# Patient Record
Sex: FEMALE | Race: WHITE | NOT HISPANIC OR LATINO | Employment: OTHER | URBAN - METROPOLITAN AREA
[De-identification: names, ages, dates, MRNs, and addresses within clinical notes are randomized per-mention and may not be internally consistent; named-entity substitution may affect disease eponyms.]

---

## 2014-07-16 LAB — CRC RECOMMENDATION EXT: NORMAL

## 2017-12-14 ENCOUNTER — HISTORICAL (OUTPATIENT)
Dept: ADMINISTRATIVE | Facility: HOSPITAL | Age: 57
End: 2017-12-14

## 2022-01-06 ENCOUNTER — HISTORICAL (OUTPATIENT)
Dept: ADMINISTRATIVE | Facility: HOSPITAL | Age: 62
End: 2022-01-06

## 2022-01-06 LAB — SARS-COV-2 RNA RESP QL NAA+PROBE: POSITIVE

## 2022-04-07 ENCOUNTER — HISTORICAL (OUTPATIENT)
Dept: ADMINISTRATIVE | Facility: HOSPITAL | Age: 62
End: 2022-04-07

## 2022-04-23 VITALS
SYSTOLIC BLOOD PRESSURE: 126 MMHG | BODY MASS INDEX: 24.98 KG/M2 | OXYGEN SATURATION: 100 % | WEIGHT: 149.94 LBS | HEIGHT: 65 IN | DIASTOLIC BLOOD PRESSURE: 75 MMHG

## 2022-05-01 NOTE — HISTORICAL [facility] CERNER
This is a historical note converted from Hermila. Formatting and pictures may have been removed.  Please reference Hermila for original formatting and attached multimedia. Chief Complaint  fell off latter around 8 am - rolled Lt ankle, bruised tailbone and hit head on wall  History of Present Illness  56 yo F with c/o fall down one step of a step ladder just PTA, rolled L ankle, fell onto buttock, light hit onto R side of head, only pain currently is L lateral ankle, pain with ambulation, tried ice.  Review of Systems  Constitutional_negative for fever  ENMT_negative for rhinorrhea, sinus pressure, sore throat,?blurry vision or change in vision  Respiratory_negative for?cough, SOB  Gastrointestinal_negative for nausea or vomiting  Integumentary_+ecchymosis and bruising to L lateral ankle  Physical Exam  Vitals & Measurements  T:?36.5? ?C ?(Oral)? HR:?83?(Peripheral)? RR:?18? BP:?104/62? SpO2:?99%?  HT:?163?cm? HT:?163?cm? WT:?66.8?kg? WT:?66.8?kg? BMI:?25.14?  Assessment/Plan  1.?Left ankle pain  ? X-ray of the left ankle done today, per my review negative for fracture.? Will call with final report.  Ordered:  Office/Outpatient Visit Level 3 Established [id number] PC, Left ankle pain  Fall, [facility], 17 10:35:00 CST  XR Ankle Left Minimum 3 Views, Routine, 17 10:35:00 CST, Pain, lateral ankle pain, None, Ambulatory, Rad Type, Left ankle pain  Fall, Not Scheduled, 17 10:35:00 CST  ?  3.?Left ankle sprain  ? Recommend ice, elevate, and anti inflammatory?like Toradol or Ibuprofen with food.  ?  Orders:  ketorolac, 10 mg = 1 tab(s), Oral, QID, PRN PRN for pain, X 5 day(s), # 30 tab(s), 0 Refill(s), Pharmacy: [facility]   Problem List/Past Medical History  Ongoing  No chronic problems  Historical  Procedure/Surgical History   ()  bilateral knee surgery ()   ()  Medications  Toradol 10 mg oral tablet, 10 mg= 1 tab(s), Oral, QID, PRN  Allergies  No Known Medication  Allergies  Social History  Alcohol - 12/14/2017  Never  Substance Abuse - 12/14/2017  Never  Tobacco - 12/14/2017  Never smoker  Family History  Family history is negative

## 2023-04-06 ENCOUNTER — OFFICE VISIT (OUTPATIENT)
Dept: URGENT CARE | Facility: CLINIC | Age: 63
End: 2023-04-06
Payer: COMMERCIAL

## 2023-04-06 VITALS
TEMPERATURE: 99 F | DIASTOLIC BLOOD PRESSURE: 69 MMHG | HEART RATE: 76 BPM | OXYGEN SATURATION: 100 % | SYSTOLIC BLOOD PRESSURE: 110 MMHG | WEIGHT: 135 LBS | RESPIRATION RATE: 17 BRPM | HEIGHT: 64 IN | BODY MASS INDEX: 23.05 KG/M2

## 2023-04-06 DIAGNOSIS — J02.8 ACUTE PHARYNGITIS DUE TO OTHER SPECIFIED ORGANISMS: Primary | ICD-10-CM

## 2023-04-06 DIAGNOSIS — J02.9 SORE THROAT: ICD-10-CM

## 2023-04-06 LAB
CTP QC/QA: YES
MOLECULAR STREP A: NEGATIVE

## 2023-04-06 PROCEDURE — 99212 PR OFFICE/OUTPT VISIT, EST, LEVL II, 10-19 MIN: ICD-10-PCS | Mod: ,,, | Performed by: FAMILY MEDICINE

## 2023-04-06 PROCEDURE — 87651 STREP A DNA AMP PROBE: CPT | Mod: QW,,, | Performed by: FAMILY MEDICINE

## 2023-04-06 PROCEDURE — 99212 OFFICE O/P EST SF 10 MIN: CPT | Mod: ,,, | Performed by: FAMILY MEDICINE

## 2023-04-06 PROCEDURE — 87651 POCT STREP A MOLECULAR: ICD-10-PCS | Mod: QW,,, | Performed by: FAMILY MEDICINE

## 2023-04-06 NOTE — PATIENT INSTRUCTIONS
Discussed the physical finding, concerns of possible early testing examination.  Monitor the symptoms closely.  Adequate hydration.  Strep test negative today  With persistent symptoms and fever return to clinic after 24 hours as nurse's visit for repeat strep test  Warm saltwater gargles for sore throat.  Tylenol ibuprofen for pain and fever.  Monitor the symptoms.  Call or return to clinic for any questions

## 2023-04-06 NOTE — PROGRESS NOTES
"Subjective:      Patient ID: Swapna Hayes is a 63 y.o. female.    Vitals:  height is 5' 4" (1.626 m) and weight is 61.2 kg (135 lb). Her temperature is 99.1 °F (37.3 °C). Her blood pressure is 110/69 and her pulse is 76. Her respiration is 17 and oxygen saturation is 100%.     Chief Complaint: Sore Throat (Sore throat since Monday with slight fatigue. 99 temp Tuesday. [place] better Wednesday but then symptoms returned again. Tried otc med cold/flu med.)    HPI:  63-year-old female otherwise healthy present to clinic with concerns of sore throat and fatigue since 4 days.  T-max at home 100.5.  Currently on Tylenol ibuprofen some help.  No concerns of exposure to infections.  Teaches at school.  Vaccinated for COVID-19.  Defers testing for COVID today    ROS :  Constitutional : _ fever , Body aches, Chills  HENT_sore throat, postnasal drainage  Respiratory_no wheezing, no shortness of breath  Cardiovascular_no chest pain  Gastrointestinal_ No vomiting, No diarrhea, No abdominal pain  Musculoskeletal_no joint pain, no joint swelling  Integumentary_no skin rash or abnormal lesion    Objective:     Physical Exam  General : Alert and Oriented, No apparent distress, afebrile  Neck - supple, anterior cervical lymph nodes enlarged tender to palpate  HENT : Oropharynx and tonsils appears erythematous and swollen, tonsils 2+ bilateral, no exudate..  Left ear canal excessive cerumen, right TM intact no redness    Respiratory : Bilateral equal breath sounds, nonlabored respirations  Cardiovascular : Rate, rhythm regular, normal volume pulse, no murmur  Gastrointestinal: Full abdomen, soft, nontender to palpate  Integumentary : Warm, Dry     Assessment:     1. Acute pharyngitis due to other specified organisms    2. Sore throat        Plan:   Discussed the physical finding, concerns of possible early testing examination.  Monitor the symptoms closely.  Adequate hydration.  Strep test negative today  With persistent symptoms and " fever return to clinic after 24 hours as nurse's visit for repeat strep test  Warm saltwater gargles for sore throat.  Tylenol ibuprofen for pain and fever.  Monitor the symptoms.  Call or return to clinic for any questions    Acute pharyngitis due to other specified organisms    Sore throat  -     POCT Strep A, Molecular

## 2023-04-10 ENCOUNTER — OFFICE VISIT (OUTPATIENT)
Dept: URGENT CARE | Facility: CLINIC | Age: 63
End: 2023-04-10
Payer: COMMERCIAL

## 2023-04-10 VITALS
HEART RATE: 87 BPM | HEIGHT: 64 IN | SYSTOLIC BLOOD PRESSURE: 110 MMHG | RESPIRATION RATE: 18 BRPM | TEMPERATURE: 99 F | DIASTOLIC BLOOD PRESSURE: 66 MMHG | OXYGEN SATURATION: 98 % | BODY MASS INDEX: 22.88 KG/M2 | WEIGHT: 134 LBS

## 2023-04-10 DIAGNOSIS — J02.9 SORE THROAT: Primary | ICD-10-CM

## 2023-04-10 LAB
CTP QC/QA: YES
MOLECULAR STREP A: NEGATIVE
POC MOLECULAR INFLUENZA A AGN: NEGATIVE
POC MOLECULAR INFLUENZA B AGN: NEGATIVE
SARS-COV-2 RDRP RESP QL NAA+PROBE: NEGATIVE

## 2023-04-10 PROCEDURE — 99213 OFFICE O/P EST LOW 20 MIN: CPT | Mod: ,,, | Performed by: FAMILY MEDICINE

## 2023-04-10 PROCEDURE — 87502 INFLUENZA DNA AMP PROBE: CPT | Mod: QW,,, | Performed by: FAMILY MEDICINE

## 2023-04-10 PROCEDURE — 87651 POCT STREP A MOLECULAR: ICD-10-PCS | Mod: QW,,, | Performed by: FAMILY MEDICINE

## 2023-04-10 PROCEDURE — 87651 STREP A DNA AMP PROBE: CPT | Mod: QW,,, | Performed by: FAMILY MEDICINE

## 2023-04-10 PROCEDURE — 87502 POCT INFLUENZA A/B MOLECULAR: ICD-10-PCS | Mod: QW,,, | Performed by: FAMILY MEDICINE

## 2023-04-10 PROCEDURE — 87635: ICD-10-PCS | Mod: QW,,, | Performed by: FAMILY MEDICINE

## 2023-04-10 PROCEDURE — 99213 PR OFFICE/OUTPT VISIT, EST, LEVL III, 20-29 MIN: ICD-10-PCS | Mod: ,,, | Performed by: FAMILY MEDICINE

## 2023-04-10 PROCEDURE — 87635 SARS-COV-2 COVID-19 AMP PRB: CPT | Mod: QW,,, | Performed by: FAMILY MEDICINE

## 2023-04-10 RX ORDER — PREDNISONE 20 MG/1
20 TABLET ORAL 2 TIMES DAILY
Qty: 6 TABLET | Refills: 0 | Status: SHIPPED | OUTPATIENT
Start: 2023-04-10 | End: 2023-04-13

## 2023-04-10 RX ORDER — CEPHALEXIN 500 MG/1
500 CAPSULE ORAL EVERY 8 HOURS
Qty: 15 CAPSULE | Refills: 0 | Status: SHIPPED | OUTPATIENT
Start: 2023-04-10 | End: 2023-04-15

## 2023-04-10 NOTE — PROGRESS NOTES
"Subjective:      Patient ID: Swapna Hayes is a 63 y.o. female.    Vitals:  height is 5' 4" (1.626 m) and weight is 60.8 kg (134 lb). Her temperature is 99.2 °F (37.3 °C). Her blood pressure is 110/66 and her pulse is 87. Her respiration is 18 and oxygen saturation is 98%.     Chief Complaint: Sore Throat (Sore throat, cough, fatigue - seen 4/6 and not better but symptoms started about a week ago )    HPI:  63-year-old returns to clinic with concerns of continuing swollen neck glands, feeling feverish, coughing and sore throat.  Symptoms close to a week now.  Seen in the clinic initially tested negative for strep.  Temp in the clinic 99.2.  No concerns of exposure to infections.  Teacher at school    ROS :  Constitutional : _ feverish , Body aches, Chills  Eyes : _No redness, drainage or pain  HENT_sore throat, postnasal drainage  Respiratory_no wheezing, no shortness of breath  Cardiovascular_no chest pain  Gastrointestinal_ No vomiting, No diarrhea, No abdominal pain  Musculoskeletal_no joint pain, no joint swelling  Integumentary_no skin rash     Objective:     Physical Exam  General : Alert and Oriented, No apparent distress, afebrile  Neck - supple, anterior cervical lymph nodes enlarged tender to palpate mainly on left side  HENT : Oropharynx mild redness and swelling, tonsils not enlarged, TMs intact mild fluid no redness.   Respiratory : Bilateral equal breath sounds, nonlabored respirations  Cardiovascular : Rate, rhythm regular, normal volume pulse, no murmur  Integumentary : Warm, Dry and no rash    Assessment:     1. Sore throat        Plan:   Discussed the physical findings, condition and course.  Considering the duration of symptoms medications as directed.  Warm saltwater gargles for sore throat.  Tylenol ibuprofen for pain and discomfort.  Call or return to clinic for any questions.  Follow-up with primary MD  Strep test negative, flu test negative, COVID-19 test negative    Sore throat  -     " POCT COVID-19 Rapid Screening  -     POCT Influenza A/B MOLECULAR  -     POCT Strep A, Molecular  -     cephALEXin (KEFLEX) 500 MG capsule; Take 1 capsule (500 mg total) by mouth every 8 (eight) hours. for 5 days  Dispense: 15 capsule; Refill: 0  -     predniSONE (DELTASONE) 20 MG tablet; Take 1 tablet (20 mg total) by mouth 2 (two) times daily. for 3 days  Dispense: 6 tablet; Refill: 0

## 2023-04-10 NOTE — PATIENT INSTRUCTIONS
Discussed the physical findings, condition and course.  Considering the duration of symptoms medications as directed.  Warm saltwater gargles for sore throat.  Tylenol ibuprofen for pain and discomfort.  Call or return to clinic for any questions.  Follow-up with primary MD  Strep test negative, flu test negative, COVID-19 test negative

## 2023-05-18 ENCOUNTER — OFFICE VISIT (OUTPATIENT)
Dept: URGENT CARE | Facility: CLINIC | Age: 63
End: 2023-05-18
Payer: COMMERCIAL

## 2023-05-18 VITALS
SYSTOLIC BLOOD PRESSURE: 134 MMHG | BODY MASS INDEX: 22.88 KG/M2 | HEART RATE: 96 BPM | HEIGHT: 64 IN | TEMPERATURE: 99 F | DIASTOLIC BLOOD PRESSURE: 96 MMHG | WEIGHT: 134 LBS | OXYGEN SATURATION: 98 % | RESPIRATION RATE: 16 BRPM

## 2023-05-18 DIAGNOSIS — H10.9 CONJUNCTIVITIS OF BOTH EYES, UNSPECIFIED CONJUNCTIVITIS TYPE: Primary | ICD-10-CM

## 2023-05-18 PROCEDURE — 99213 PR OFFICE/OUTPT VISIT, EST, LEVL III, 20-29 MIN: ICD-10-PCS | Mod: ,,, | Performed by: FAMILY MEDICINE

## 2023-05-18 PROCEDURE — 99213 OFFICE O/P EST LOW 20 MIN: CPT | Mod: ,,, | Performed by: FAMILY MEDICINE

## 2023-05-18 RX ORDER — OFLOXACIN 3 MG/ML
1 SOLUTION/ DROPS OPHTHALMIC 4 TIMES DAILY
Qty: 5 ML | Refills: 0 | Status: SHIPPED | OUTPATIENT
Start: 2023-05-18 | End: 2023-05-25

## 2023-05-18 NOTE — PATIENT INSTRUCTIONS
Plan:   Medication sent to pharmacy  Wash hands before and after using the eyedrops  As discussed, you need to take your contact lenses out as soon as possible.  Do not put your contact lenses back in into the eyes 100% better.  Use new contact lenses at that time.  As discussed contact lens use hers can also develop inflammatory processes of the eye.  Therefore if your symptoms are not improving or they are was worsening such as changes in vision, sensitivity to light, or sharp or severe eye ball pain, seek medical attention immediately

## 2023-05-18 NOTE — PROGRESS NOTES
"Subjective:      Patient ID: Swapna Hayes is a 63 y.o. female.    Vitals:  height is 5' 4" (1.626 m) and weight is 60.8 kg (134 lb). Her temperature is 99.1 °F (37.3 °C). Her blood pressure is 134/96 (abnormal) and her pulse is 96. Her respiration is 16 and oxygen saturation is 98%.     Chief Complaint: Eye Problem (Bilateral eye redness noticed today. Some eye drainage and irritation. Concern for pink eye.)    63-year-old female presents to clinic complaining of bilateral eye redness and goopy discharge from the eyes.  Denies any sharp or severe eye pain.  Denies any changes in vision or sensitivity to light.  Patient is a contact lens user.  She has not removed her contact lenses yet.      Constitution: Negative.   HENT: Negative.     Cardiovascular: Negative.    Eyes:  Positive for eye discharge and eye pain.   Respiratory: Negative.     Gastrointestinal: Negative.    Genitourinary: Negative.    Musculoskeletal: Negative.    Skin: Negative.    Allergic/Immunologic: Negative.    Neurological: Negative.    Hematologic/Lymphatic: Negative.     Objective:     Physical Exam   Constitutional: She is oriented to person, place, and time.  Non-toxic appearance. She does not appear ill. No distress.   HENT:   Head: Normocephalic and atraumatic.   Eyes: Conjunctivae are normal. Pupils are equal, round, and reactive to light. Right eye exhibits discharge (erythema of the bilateral conjunctiva). Left eye exhibits discharge. Extraocular movement intact   Abdominal: Normal appearance.   Neurological: She is alert and oriented to person, place, and time.   Skin: Skin is not diaphoretic.   Psychiatric: Her behavior is normal. Mood, judgment and thought content normal.   Vitals reviewed.       Previous History      Review of patient's allergies indicates:  No Known Allergies    Past Medical History:   Diagnosis Date    Known health problems: none      Current Outpatient Medications   Medication Instructions    ofloxacin " "(OCUFLOX) 0.3 % ophthalmic solution 1 drop, Both Eyes, 4 times daily     Past Surgical History:   Procedure Laterality Date     SECTION      Closed treatment of trimalleolar ankle fracture; with manipulation (2018)      KNEE SURGERY Bilateral 1993    Repair Right Hand Subcutaneous Tissue and Fascia, Open Approach (2018)      Reposition Right Fibula, External Approach (2018)      Reposition Right Tibia, External Approach (2018)       Family History   Problem Relation Age of Onset    No Known Problems Mother     No Known Problems Father     No Known Problems Sister     No Known Problems Brother        Social History     Tobacco Use    Smoking status: Never    Smokeless tobacco: Never   Substance Use Topics    Alcohol use: Yes    Drug use: Never        Physical Exam      Vital Signs Reviewed   BP (!) 134/96   Pulse 96   Temp 99.1 °F (37.3 °C)   Resp 16   Ht 5' 4" (1.626 m)   Wt 60.8 kg (134 lb)   SpO2 98%   BMI 23.00 kg/m²        Procedures    Procedures     Labs     Results for orders placed or performed in visit on 04/10/23   POCT COVID-19 Rapid Screening   Result Value Ref Range    POC Rapid COVID Negative Negative     Acceptable Yes    POCT Influenza A/B MOLECULAR   Result Value Ref Range    POC Molecular Influenza A Ag Negative Negative, Not Reported    POC Molecular Influenza B Ag Negative Negative, Not Reported     Acceptable Yes    POCT Strep A, Molecular   Result Value Ref Range    Molecular Strep A, POC Negative Negative     Acceptable Yes        Assessment:     1. Conjunctivitis of both eyes, unspecified conjunctivitis type        Plan:   Medication sent to pharmacy  Wash hands before and after using the eyedrops  As discussed, you need to take your contact lenses out as soon as possible.  Do not put your contact lenses back in into the eyes 100% better.  Use new contact lenses at that time.  As discussed contact lens use " hers can also develop inflammatory processes of the eye.  Therefore if your symptoms are not improving or they are was worsening such as changes in vision, sensitivity to light, or sharp or severe eye ball pain, seek medical attention immediately    Conjunctivitis of both eyes, unspecified conjunctivitis type    Other orders  -     ofloxacin (OCUFLOX) 0.3 % ophthalmic solution; Place 1 drop into both eyes 4 (four) times daily. for 7 days  Dispense: 5 mL; Refill: 0

## 2023-07-15 PROBLEM — M15.9 PRIMARY OSTEOARTHRITIS INVOLVING MULTIPLE JOINTS: Status: ACTIVE | Noted: 2023-07-15

## 2023-07-15 PROBLEM — E78.2 MIXED HYPERLIPIDEMIA: Status: ACTIVE | Noted: 2023-07-15

## 2023-07-15 PROBLEM — M15.0 PRIMARY OSTEOARTHRITIS INVOLVING MULTIPLE JOINTS: Status: ACTIVE | Noted: 2023-07-15

## 2023-07-26 ENCOUNTER — TELEPHONE (OUTPATIENT)
Dept: FAMILY MEDICINE | Facility: CLINIC | Age: 63
End: 2023-07-26

## 2023-07-26 DIAGNOSIS — M15.9 PRIMARY OSTEOARTHRITIS INVOLVING MULTIPLE JOINTS: ICD-10-CM

## 2023-07-26 DIAGNOSIS — E78.2 MIXED HYPERLIPIDEMIA: ICD-10-CM

## 2023-07-26 DIAGNOSIS — Z00.00 WELLNESS EXAMINATION: Primary | ICD-10-CM

## 2023-07-26 NOTE — TELEPHONE ENCOUNTER
----- Message from Beth Agarwal sent at 7/26/2023  9:28 AM CDT -----  Regarding: lab order  Pt sis requesting lab order to be sent to [facility] on [facility] for wellness visit

## 2023-08-17 DIAGNOSIS — E78.2 MIXED HYPERLIPIDEMIA: ICD-10-CM

## 2023-08-17 DIAGNOSIS — M15.9 PRIMARY OSTEOARTHRITIS INVOLVING MULTIPLE JOINTS: ICD-10-CM

## 2023-08-17 DIAGNOSIS — Z00.00 WELLNESS EXAMINATION: Primary | ICD-10-CM

## 2023-09-08 LAB
ALBUMIN SERPL-MCNC: 4.9 G/DL (ref 3.9–4.9)
ALBUMIN/GLOB SERPL: 2.2 {RATIO} (ref 1.2–2.2)
ALP SERPL-CCNC: 98 IU/L (ref 44–121)
ALT SERPL-CCNC: 15 IU/L (ref 0–32)
APPEARANCE UR: CLEAR
AST SERPL-CCNC: 22 IU/L (ref 0–40)
BACTERIA #/AREA URNS HPF: ABNORMAL /[HPF]
BASOPHILS # BLD AUTO: 0 X10E3/UL (ref 0–0.2)
BASOPHILS NFR BLD AUTO: 1 %
BILIRUB SERPL-MCNC: 0.4 MG/DL (ref 0–1.2)
BILIRUB UR QL STRIP: NEGATIVE
BUN SERPL-MCNC: 24 MG/DL (ref 8–27)
BUN/CREAT SERPL: 33 (ref 12–28)
CALCIUM SERPL-MCNC: 10 MG/DL (ref 8.7–10.3)
CHLORIDE SERPL-SCNC: 101 MMOL/L (ref 96–106)
CHOLEST SERPL-MCNC: 259 MG/DL (ref 100–199)
CO2 SERPL-SCNC: 26 MMOL/L (ref 20–29)
COLOR UR: YELLOW
CREAT SERPL-MCNC: 0.73 MG/DL (ref 0.57–1)
CRYSTALS URNS MICRO: ABNORMAL
EOSINOPHIL # BLD AUTO: 0.3 X10E3/UL (ref 0–0.4)
EOSINOPHIL NFR BLD AUTO: 5 %
EPI CELLS #/AREA URNS HPF: ABNORMAL /HPF (ref 0–10)
ERYTHROCYTE [DISTWIDTH] IN BLOOD BY AUTOMATED COUNT: 12.8 % (ref 11.7–15.4)
EST. GFR  (NO RACE VARIABLE): 92 ML/MIN/1.73
GLOBULIN SER CALC-MCNC: 2.2 G/DL (ref 1.5–4.5)
GLUCOSE SERPL-MCNC: 108 MG/DL (ref 70–99)
GLUCOSE UR QL STRIP: NEGATIVE
HBA1C MFR BLD: 5.7 % (ref 4.8–5.6)
HCT VFR BLD AUTO: 42.1 % (ref 34–46.6)
HCV AB SERPL QL IA: NORMAL
HCV IGG SERPL QL IA: NON REACTIVE
HDLC SERPL-MCNC: 73 MG/DL
HGB BLD-MCNC: 14.2 G/DL (ref 11.1–15.9)
HGB UR QL STRIP: NEGATIVE
HIV 1+2 AB+HIV1 P24 AG SERPL QL IA: NON REACTIVE
KETONES UR QL STRIP: NEGATIVE
LDLC SERPL CALC-MCNC: 168 MG/DL (ref 0–99)
LEUKOCYTE ESTERASE UR QL STRIP: NEGATIVE
LYMPHOCYTES # BLD AUTO: 2.3 X10E3/UL (ref 0.7–3.1)
LYMPHOCYTES NFR BLD AUTO: 42 %
MCH RBC QN AUTO: 30.4 PG (ref 26.6–33)
MCHC RBC AUTO-ENTMCNC: 33.7 G/DL (ref 31.5–35.7)
MCV RBC AUTO: 90 FL (ref 79–97)
MICRO URNS: NORMAL
MICRO URNS: NORMAL
MONOCYTES # BLD AUTO: 0.4 X10E3/UL (ref 0.1–0.9)
MONOCYTES NFR BLD AUTO: 8 %
MUCOUS THREADS URNS QL MICRO: PRESENT
NEUTROPHILS # BLD AUTO: 2.5 X10E3/UL (ref 1.4–7)
NEUTROPHILS NFR BLD AUTO: 44 %
NITRITE UR QL STRIP: NEGATIVE
PH UR STRIP: 6 [PH] (ref 5–7.5)
PLATELET # BLD AUTO: 256 X10E3/UL (ref 150–450)
POTASSIUM SERPL-SCNC: 4.8 MMOL/L (ref 3.5–5.2)
PROT SERPL-MCNC: 7.1 G/DL (ref 6–8.5)
PROT UR QL STRIP: NEGATIVE
RBC # BLD AUTO: 4.67 X10E6/UL (ref 3.77–5.28)
RBC #/AREA URNS HPF: ABNORMAL /HPF (ref 0–2)
SODIUM SERPL-SCNC: 141 MMOL/L (ref 134–144)
SP GR UR STRIP: 1.03 (ref 1–1.03)
SPECIMEN STATUS REPORT: NORMAL
TRIGL SERPL-MCNC: 104 MG/DL (ref 0–149)
TSH SERPL DL<=0.005 MIU/L-ACNC: 1.66 UIU/ML (ref 0.45–4.5)
UROBILINOGEN UR STRIP-MCNC: 1 MG/DL (ref 0.2–1)
VLDLC SERPL CALC-MCNC: 18 MG/DL (ref 5–40)
WBC # BLD AUTO: 5.6 X10E3/UL (ref 3.4–10.8)
WBC #/AREA URNS HPF: ABNORMAL /HPF (ref 0–5)

## 2023-09-13 ENCOUNTER — OFFICE VISIT (OUTPATIENT)
Dept: FAMILY MEDICINE | Facility: CLINIC | Age: 63
End: 2023-09-13
Payer: COMMERCIAL

## 2023-09-13 VITALS
SYSTOLIC BLOOD PRESSURE: 116 MMHG | WEIGHT: 141 LBS | TEMPERATURE: 99 F | DIASTOLIC BLOOD PRESSURE: 76 MMHG | HEIGHT: 64 IN | OXYGEN SATURATION: 97 % | HEART RATE: 83 BPM | BODY MASS INDEX: 24.07 KG/M2

## 2023-09-13 DIAGNOSIS — E78.2 MIXED HYPERLIPIDEMIA: ICD-10-CM

## 2023-09-13 DIAGNOSIS — Z13.6 ENCOUNTER FOR SCREENING FOR CARDIOVASCULAR DISORDERS: ICD-10-CM

## 2023-09-13 DIAGNOSIS — R73.9 BLOOD GLUCOSE ELEVATED: ICD-10-CM

## 2023-09-13 DIAGNOSIS — Z00.00 WELLNESS EXAMINATION: Primary | ICD-10-CM

## 2023-09-13 DIAGNOSIS — M81.0 AGE-RELATED OSTEOPOROSIS WITHOUT CURRENT PATHOLOGICAL FRACTURE: ICD-10-CM

## 2023-09-13 PROCEDURE — 1159F PR MEDICATION LIST DOCUMENTED IN MEDICAL RECORD: ICD-10-PCS | Mod: CPTII,,, | Performed by: FAMILY MEDICINE

## 2023-09-13 PROCEDURE — 3044F HG A1C LEVEL LT 7.0%: CPT | Mod: CPTII,,, | Performed by: FAMILY MEDICINE

## 2023-09-13 PROCEDURE — 1160F RVW MEDS BY RX/DR IN RCRD: CPT | Mod: CPTII,,, | Performed by: FAMILY MEDICINE

## 2023-09-13 PROCEDURE — 3008F PR BODY MASS INDEX (BMI) DOCUMENTED: ICD-10-PCS | Mod: CPTII,,, | Performed by: FAMILY MEDICINE

## 2023-09-13 PROCEDURE — 1159F MED LIST DOCD IN RCRD: CPT | Mod: CPTII,,, | Performed by: FAMILY MEDICINE

## 2023-09-13 PROCEDURE — 99396 PR PREVENTIVE VISIT,EST,40-64: ICD-10-PCS | Mod: ,,, | Performed by: FAMILY MEDICINE

## 2023-09-13 PROCEDURE — 3008F BODY MASS INDEX DOCD: CPT | Mod: CPTII,,, | Performed by: FAMILY MEDICINE

## 2023-09-13 PROCEDURE — 3074F PR MOST RECENT SYSTOLIC BLOOD PRESSURE < 130 MM HG: ICD-10-PCS | Mod: CPTII,,, | Performed by: FAMILY MEDICINE

## 2023-09-13 PROCEDURE — 3078F DIAST BP <80 MM HG: CPT | Mod: CPTII,,, | Performed by: FAMILY MEDICINE

## 2023-09-13 PROCEDURE — 99396 PREV VISIT EST AGE 40-64: CPT | Mod: ,,, | Performed by: FAMILY MEDICINE

## 2023-09-13 PROCEDURE — 3044F PR MOST RECENT HEMOGLOBIN A1C LEVEL <7.0%: ICD-10-PCS | Mod: CPTII,,, | Performed by: FAMILY MEDICINE

## 2023-09-13 PROCEDURE — 3078F PR MOST RECENT DIASTOLIC BLOOD PRESSURE < 80 MM HG: ICD-10-PCS | Mod: CPTII,,, | Performed by: FAMILY MEDICINE

## 2023-09-13 PROCEDURE — 3074F SYST BP LT 130 MM HG: CPT | Mod: CPTII,,, | Performed by: FAMILY MEDICINE

## 2023-09-13 PROCEDURE — 1160F PR REVIEW ALL MEDS BY PRESCRIBER/CLIN PHARMACIST DOCUMENTED: ICD-10-PCS | Mod: CPTII,,, | Performed by: FAMILY MEDICINE

## 2023-09-13 RX ORDER — IBUPROFEN 800 MG/1
800 TABLET ORAL EVERY 6 HOURS PRN
COMMUNITY
Start: 2023-06-05 | End: 2023-09-13

## 2023-09-13 RX ORDER — TRIAZOLAM 0.25 MG/1
TABLET ORAL
COMMUNITY
Start: 2023-05-18 | End: 2023-09-13

## 2023-09-13 RX ORDER — HYDROXYZINE HYDROCHLORIDE 25 MG/1
25 TABLET, FILM COATED ORAL
COMMUNITY
Start: 2023-05-18 | End: 2023-09-13

## 2023-09-13 RX ORDER — DIAZEPAM 10 MG/1
10 TABLET ORAL NIGHTLY
COMMUNITY
Start: 2023-05-18 | End: 2023-09-13

## 2023-09-13 NOTE — PROGRESS NOTES
"  Patient ID: [id number]     Chief Complaint:  Wellness      HPI:     Swapna Hayes is a 63 y.o. female here today for an annual wellness. Reviewed and discussed lab results.   Since last visit patient has retired-very happy with prison.  Continuing to make diet and lifestyle modifications in order to increase her quality of life.      Past Medical History:   Diagnosis Date    Mixed hyperlipidemia 7/15/2023    Primary osteoarthritis involving multiple joints 7/15/2023        Past Surgical History:   Procedure Laterality Date     SECTION      Closed treatment of trimalleolar ankle fracture; with manipulation (2018)      KNEE SURGERY Bilateral 1993    Repair Right Hand Subcutaneous Tissue and Fascia, Open Approach (2018)      Reposition Right Fibula, External Approach (2018)      Reposition Right Tibia, External Approach (2018)      TOTAL KNEE ARTHROPLASTY Left 2022        Social History     Tobacco Use    Smoking status: Never    Smokeless tobacco: Never   Substance Use Topics    Alcohol use: Yes    Drug use: Never        Social History     Socioeconomic History    Marital status:      Spouse name: Jose    Number of children: 2        No current outpatient medications    Review of patient's allergies indicates:  No Known Allergies     Patient Care Team:  Ivette Cordero MD as PCP - General (Family Medicine)  Randolph Dykes MD as Consulting Physician (Orthopedic Surgery)     Subjective:     Review of Systems    12 point review of systems conducted, negative except as stated in the history of present illness. See HPI for details.      Objective:     Visit Vitals  /76   Pulse 83   Temp 98.5 °F (36.9 °C)   Ht 5' 4" (1.626 m)   Wt 64 kg (141 lb)   SpO2 97%   BMI 24.20 kg/m²       Physical Exam    General: Alert and oriented, No acute distress.  Head: Normocephalic, Atraumatic.  Eye: Pupils are equal, round and reactive to light, Extraocular movements are intact, " "Sclera non-icteric.  Ears/Nose/Throat: Normal, Mucosa moist,Clear.  Neck/Thyroid: Supple, Non-tender, No carotid bruit, No palpable thyromegaly or thyroid nodule, No lymphadenopathy, No JVD, Full range of motion.  Respiratory: Clear to auscultation bilaterally; No wheezes, rales or rhonchi,Non-labored respirations, Symmetrical chest wall expansion.  Cardiovascular: Regular rate and rhythm, S1/S2 normal, No murmurs, rubs or gallops.  Gastrointestinal: Soft, Non-tender, Non-distended, Normal bowel sounds, No palpable organomegaly.  Musculoskeletal: Normal range of motion.  Integumentary: Warm, Dry, Intact, No suspicious lesions or rashes.  Extremities: No clubbing, cyanosis or edema  Neurologic: No focal deficits, Cranial Nerves II-XII are grossly intact, Motor strength normal upper and lower extremities, Sensory exam intact.  Psychiatric: Normal interaction, Coherent speech, Euthymic mood, Appropriate affect       Assessment:       ICD-10-CM ICD-9-CM   1. Wellness examination  Z00.00 V70.0   2. Blood glucose elevated  R73.9 790.29   3. Mixed hyperlipidemia  E78.2 272.2   4. Age-related osteoporosis without current pathological fracture  M81.0 733.01   5. Encounter for screening for cardiovascular disorders  Z13.6 V81.2        Plan:       Cervical Cancer Screening - Pap Up to date     Breast Cancer Screening - Mammogram  Up to date    Colon Cancer Screening - Colon cancer screening Up to date     Eye Exam - Recommend annually.    Dental Exam - Recommend biannual exams.     Vaccinations -   There is no immunization history on file for this patient.       1. Wellness examination  Wellness: Five Rules Reviewed: Water/Nutrition-Real Food, Limit Fast Food/ Exercise 20-30 minutes most days of the week/ Mental health- "Is your work a calling or paycheck" Define 'What is your purpose-what matters to you' Stress- Is an Individual Response- Therefore Can be Controlled by the Individual. Meditation/ Immunizations/Multivitamin " use-Vitamin D3/ Screenings     2. Blood glucose elevated  Fasting Sugar 108- Hemoglobin A1c 5.7  Normal less than 5.7 - Diagnosis of Type 2 Diabetes Mellitus at 6.5. Encourage diet and activity modification- Pathophysiology/treatment/dangers discussed.     3. Mixed hyperlipidemia  Lab Results   Component Value Date    CHOL 259 (H) 09/07/2023    TRIG 104 09/07/2023    HDL 73 09/07/2023     Pathophysiology/treatment/dangers discussed. Patient to continue diet modification-obtain calcium coronary score in order to make decisions about treatment.   Total cholesterol 259 ( Goal less than 200) HDL 73 ( Goal high as possible)  ( Goal less than 100) Triglycerides 104 ( Goal less than 150)   The 10-year ASCVD risk score (Gil RHODES, et al., 2019) is: 4.2%    Values used to calculate the score:      Age: 63 years      Sex: Female      Is Non- : No      Diabetic: No      Tobacco smoker: No      Systolic Blood Pressure: 116 mmHg      Is BP treated: No      HDL Cholesterol: 73 mg/dL      Total Cholesterol: 259 mg/dL    4. Age-related osteoporosis without current pathological fracture  Pathophysiology/Treatment/ Dangers Discussed.  Patient to continue with diet and lifestyle modification.  Importance of calcium plus vitamin-D supplementation discussed.      5. Encounter for screening for cardiovascular disorders  Check studies management based upon results.  Pathophysiology/treatment/dangers discussed.   - CT Calcium Scoring Cardiac; Future          The patient's Health Maintenance was reviewed and the following appears to be due at this time:   Health Maintenance Due   Topic Date Due    COVID-19 Vaccine (1) Never done    TETANUS VACCINE  Never done    Colorectal Cancer Screening  Never done    Influenza Vaccine (1) Never done    Cervical Cancer Screening  10/01/2023         Follow up in about 1 year (around 9/13/2024) for Medicare Wellness. In addition to their scheduled follow up, the patient has  also been instructed to follow up on as needed basis.     Future Appointments   Date Time Provider Department Center   8/1/2024  9:30 AM Ivette Cordero MD [facility] [place] [facility] TEOFILO Cordero MD

## 2023-09-14 ENCOUNTER — PATIENT OUTREACH (OUTPATIENT)
Dept: ADMINISTRATIVE | Facility: HOSPITAL | Age: 63
End: 2023-09-14
Payer: COMMERCIAL

## 2023-09-14 NOTE — LETTER
"  This communication is flagged as high priority.        AUTHORIZATION FOR RELEASE OF   CONFIDENTIAL INFORMATION    Dear [facility] Staff,    We are seeing Swapna Hayes, date of birth 1960, in the clinic at [facility]. Ivette Cordero MD is the patient's PCP. Swapna Hayes has an outstanding lab/procedure at the time we reviewed her chart. In order to help keep her health information updated, she has authorized us to request the following medical record(s):        (  )  MAMMOGRAM                                      (xx  )  COLONOSCOPY/PATH      (  )  PAP SMEAR                                          (  )  OUTSIDE LAB RESULTS     (  )  DEXA SCAN                                          (  )  EYE EXAM            (  )  FOOT EXAM                                          (  )  ENTIRE RECORD     (  )  OUTSIDE IMMUNIZATIONS                 (  )  _______________         Please fax records to [facility], Gautam, Indira, MD,  [phone]  Attn: Corie      If you have any questions, please contact Tom Song" Nicole ,Care Coordinator @ [phone]     Patient Name: Swapna Hayes  : 1960  Patient Phone #: [phone]     "

## 2023-09-19 ENCOUNTER — PATIENT OUTREACH (OUTPATIENT)
Dept: ADMINISTRATIVE | Facility: HOSPITAL | Age: 63
End: 2023-09-19
Payer: COMMERCIAL

## 2023-09-19 NOTE — PROGRESS NOTES
The following record(s)  below were uploaded for Health Maintenance .      COLONOSCOPY  2014

## 2023-09-25 ENCOUNTER — HOSPITAL ENCOUNTER (OUTPATIENT)
Dept: RADIOLOGY | Facility: HOSPITAL | Age: 63
Discharge: HOME | End: 2023-09-25
Attending: FAMILY MEDICINE
Payer: COMMERCIAL

## 2023-09-25 DIAGNOSIS — Z13.6 ENCOUNTER FOR SCREENING FOR CARDIOVASCULAR DISORDERS: ICD-10-CM

## 2023-09-25 PROCEDURE — 75571 CT HRT W/O DYE W/CA TEST: CPT | Mod: TC

## 2024-06-18 ENCOUNTER — TELEPHONE (OUTPATIENT)
Dept: FAMILY MEDICINE | Facility: CLINIC | Age: 64
End: 2024-06-18
Payer: COMMERCIAL

## 2024-06-25 DIAGNOSIS — Z00.00 WELLNESS EXAMINATION: Primary | ICD-10-CM

## 2024-06-25 DIAGNOSIS — E78.2 MIXED HYPERLIPIDEMIA: ICD-10-CM

## 2024-06-25 DIAGNOSIS — E55.9 VITAMIN D DEFICIENCY: ICD-10-CM

## 2024-07-22 ENCOUNTER — OFFICE VISIT (OUTPATIENT)
Dept: URGENT CARE | Facility: CLINIC | Age: 64
End: 2024-07-22
Payer: MEDICARE

## 2024-07-22 VITALS
TEMPERATURE: 99 F | BODY MASS INDEX: 25.61 KG/M2 | SYSTOLIC BLOOD PRESSURE: 128 MMHG | HEIGHT: 64 IN | HEART RATE: 88 BPM | OXYGEN SATURATION: 98 % | WEIGHT: 150 LBS | DIASTOLIC BLOOD PRESSURE: 76 MMHG | RESPIRATION RATE: 16 BRPM

## 2024-07-22 DIAGNOSIS — R05.1 ACUTE COUGH: Primary | ICD-10-CM

## 2024-07-22 PROCEDURE — 96372 THER/PROPH/DIAG INJ SC/IM: CPT | Mod: ,,, | Performed by: FAMILY MEDICINE

## 2024-07-22 PROCEDURE — 99213 OFFICE O/P EST LOW 20 MIN: CPT | Mod: 25,,, | Performed by: FAMILY MEDICINE

## 2024-07-22 RX ORDER — AZITHROMYCIN 250 MG/1
TABLET, FILM COATED ORAL
Qty: 6 TABLET | Refills: 0 | Status: SHIPPED | OUTPATIENT
Start: 2024-07-22

## 2024-07-22 RX ORDER — CHLOPHEDIANOL HCL AND PYRILAMINE MALEATE 12.5; 12.5 MG/5ML; MG/5ML
10 SOLUTION ORAL
Qty: 200 ML | Refills: 0 | Status: SHIPPED | OUTPATIENT
Start: 2024-07-22

## 2024-07-22 RX ORDER — DEXAMETHASONE SODIUM PHOSPHATE 100 MG/10ML
8 INJECTION INTRAMUSCULAR; INTRAVENOUS
Status: COMPLETED | OUTPATIENT
Start: 2024-07-22 | End: 2024-07-22

## 2024-07-22 RX ADMIN — DEXAMETHASONE SODIUM PHOSPHATE 8 MG: 100 INJECTION INTRAMUSCULAR; INTRAVENOUS at 11:07

## 2024-07-22 NOTE — PATIENT INSTRUCTIONS
Medication sent to pharmacy take as prescribed  If no improvement in 1-2 days, begin antibiotic  Begin using humidifier at night, avoid cold and dry air as this may make cough worse  Start taking an allergy pill daily such as claritin, zyrtec, allegrea or xyzal. Also start using a nasal steroid spray such as flonase or nasacort daily. If you are not being treated for high blood pressure, you can also take decongestant such as sudafed as needed. They can be purchased over the counter.    Monitor for fever.   Take tylenol/acetaminophen or ibuprofen as needed. Rest and hydrate. If symptoms persist or worsen, return to clinic or seek medical attention immediately.

## 2024-07-22 NOTE — PROGRESS NOTES
"Subjective:      Patient ID: Swapna Hayes is a 64 y.o. female.    Vitals:  height is 5' 4" (1.626 m) and weight is 68 kg (150 lb). Her temperature is 98.9 °F (37.2 °C). Her blood pressure is 128/76 and her pulse is 88. Her respiration is 16 and oxygen saturation is 98%.     Chief Complaint: Cough    Patient is a 64 y.o. female who presents to urgent care with complaints of cough, some congestion, mild runny nose x approx 1 week. Alleviating factors include OTC cough medication, OTC Cold & Flu medication with moderate amount of relief. Patient denies fever, SOB, chest pain.     Cough  Associated symptoms include shortness of breath. Pertinent negatives include no chills, ear pain, fever, hemoptysis, postnasal drip, sore throat or wheezing.     Constitution: Negative for chills, sweating, fatigue and fever.   HENT:  Negative for ear pain, ear discharge, congestion, postnasal drip, sinus pain, sinus pressure and sore throat.    Neck: neck negative.   Cardiovascular: Negative.    Eyes: Negative.    Respiratory:  Positive for cough and shortness of breath. Negative for sputum production, bloody sputum, stridor, wheezing and asthma.    Gastrointestinal: Negative.    Endocrine: negative.   Genitourinary: Negative.    Musculoskeletal: Negative.    Skin: Negative.    Allergic/Immunologic: Negative.  Negative for asthma.   Neurological: Negative.  Negative for disorientation and altered mental status.   Hematologic/Lymphatic: Negative.    Psychiatric/Behavioral:  Negative for altered mental status, disorientation and confusion.       Objective:     Physical Exam   Constitutional: She is oriented to person, place, and time. She appears well-developed. She is cooperative.  Non-toxic appearance. She does not appear ill. No distress.   HENT:   Head: Normocephalic and atraumatic.   Ears:   Right Ear: Hearing, tympanic membrane, external ear and ear canal normal.   Left Ear: Hearing, tympanic membrane, external ear and ear " canal normal.   Nose: Nose normal. No mucosal edema, rhinorrhea or nasal deformity. No epistaxis. Right sinus exhibits no maxillary sinus tenderness and no frontal sinus tenderness. Left sinus exhibits no maxillary sinus tenderness and no frontal sinus tenderness.   Mouth/Throat: Uvula is midline, oropharynx is clear and moist and mucous membranes are normal. No trismus in the jaw. Normal dentition. No uvula swelling. No oropharyngeal exudate, posterior oropharyngeal edema or posterior oropharyngeal erythema.   Eyes: Conjunctivae and lids are normal. No scleral icterus.   Neck: Trachea normal and phonation normal. Neck supple. No edema present. No erythema present. No neck rigidity present.   Cardiovascular: Normal rate, regular rhythm, normal heart sounds and normal pulses.   Pulmonary/Chest: Effort normal and breath sounds normal. No respiratory distress. She has no decreased breath sounds. She has no rhonchi.   Abdominal: Normal appearance.   Musculoskeletal: Normal range of motion.         General: No deformity. Normal range of motion.   Neurological: She is alert and oriented to person, place, and time. She exhibits normal muscle tone. Coordination normal.   Skin: Skin is warm, dry, intact, not diaphoretic and not pale.   Psychiatric: Her speech is normal and behavior is normal. Judgment and thought content normal.   Nursing note and vitals reviewed.         Previous History      Review of patient's allergies indicates:  No Known Allergies    Past Medical History:   Diagnosis Date    Mixed hyperlipidemia 7/15/2023    Primary osteoarthritis involving multiple joints 7/15/2023     Current Outpatient Medications   Medication Instructions    azithromycin (Z-DELL) 250 MG tablet Take 2 tablets by mouth on day 1; Take 1 tablet by mouth on days 2-5    pyrilamine-chlophedianoL (NINJACOF) 12.5-12.5 mg/5 mL Liqd 10 mLs, Oral, Every 6-8 hours PRN     Past Surgical History:   Procedure Laterality Date     SECTION    "   Closed treatment of trimalleolar ankle fracture; with manipulation (05/29/2018)      COLONOSCOPY  07/16/2014    KNEE SURGERY Bilateral 1993    Repair Right Hand Subcutaneous Tissue and Fascia, Open Approach (05/29/2018)      Reposition Right Fibula, External Approach (05/29/2018)      Reposition Right Tibia, External Approach (05/29/2018)      TOTAL KNEE ARTHROPLASTY Left 06/01/2022     Family History   Problem Relation Name Age of Onset    Hypertension Mother  94    Other Father  63        Blood Clot in Esophagus       Social History     Tobacco Use    Smoking status: Never    Smokeless tobacco: Never   Substance Use Topics    Alcohol use: Yes     Comment: occasional    Drug use: Never        Physical Exam      Vital Signs Reviewed   /76   Pulse 88   Temp 98.9 °F (37.2 °C)   Resp 16   Ht 5' 4" (1.626 m)   Wt 68 kg (150 lb)   SpO2 98%   BMI 25.75 kg/m²        Procedures    Procedures     Labs     Results for orders placed or performed in visit on 09/19/23    COLONOSCOPY   Result Value Ref Range    CRC Recommendation External Repeat colonoscopy in 10 years       Assessment:     1. Acute cough        Plan:   Medication sent to pharmacy take as prescribed  If no improvement in 1-2 days, begin antibiotic  Begin using humidifier at night, avoid cold and dry air as this may make cough worse  Start taking an allergy pill daily such as claritin, zyrtec, allegrea or xyzal. Also start using a nasal steroid spray such as flonase or nasacort daily. If you are not being treated for high blood pressure, you can also take decongestant such as sudafed as needed. They can be purchased over the counter.    Monitor for fever.   Take tylenol/acetaminophen or ibuprofen as needed. Rest and hydrate. If symptoms persist or worsen, return to clinic or seek medical attention immediately.      Acute cough    Other orders  -     dexAMETHasone injection 8 mg  -     pyrilamine-chlophedianoL (NINJACOF) 12.5-12.5 mg/5 mL Liqd; " Take 10 mLs by mouth every 6 to 8 hours as needed (cough).  Dispense: 200 mL; Refill: 0  -     azithromycin (Z-DELL) 250 MG tablet; Take 2 tablets by mouth on day 1; Take 1 tablet by mouth on days 2-5  Dispense: 6 tablet; Refill: 0

## 2024-09-16 ENCOUNTER — LAB VISIT (OUTPATIENT)
Dept: LAB | Facility: HOSPITAL | Age: 64
End: 2024-09-16
Attending: FAMILY MEDICINE
Payer: MEDICARE

## 2024-09-16 DIAGNOSIS — R73.9 BLOOD GLUCOSE ELEVATED: ICD-10-CM

## 2024-09-16 LAB
EST. AVERAGE GLUCOSE BLD GHB EST-MCNC: 105.4 MG/DL
HBA1C MFR BLD: 5.3 %

## 2024-09-16 PROCEDURE — 83036 HEMOGLOBIN GLYCOSYLATED A1C: CPT

## 2024-09-16 PROCEDURE — 36415 COLL VENOUS BLD VENIPUNCTURE: CPT

## 2024-09-17 ENCOUNTER — OFFICE VISIT (OUTPATIENT)
Dept: FAMILY MEDICINE | Facility: CLINIC | Age: 64
End: 2024-09-17
Payer: MEDICARE

## 2024-09-17 VITALS
OXYGEN SATURATION: 97 % | HEIGHT: 64 IN | BODY MASS INDEX: 26.8 KG/M2 | HEART RATE: 88 BPM | WEIGHT: 157 LBS | RESPIRATION RATE: 16 BRPM | DIASTOLIC BLOOD PRESSURE: 71 MMHG | SYSTOLIC BLOOD PRESSURE: 119 MMHG

## 2024-09-17 DIAGNOSIS — R73.9 BLOOD GLUCOSE ELEVATED: ICD-10-CM

## 2024-09-17 DIAGNOSIS — Z00.00 WELLNESS EXAMINATION: Primary | ICD-10-CM

## 2024-09-17 DIAGNOSIS — Z12.31 ENCOUNTER FOR SCREENING MAMMOGRAM FOR BREAST CANCER: ICD-10-CM

## 2024-09-17 DIAGNOSIS — M15.9 PRIMARY OSTEOARTHRITIS INVOLVING MULTIPLE JOINTS: ICD-10-CM

## 2024-09-17 DIAGNOSIS — E78.2 MIXED HYPERLIPIDEMIA: ICD-10-CM

## 2024-09-17 RX ORDER — MULTIVITAMIN
1 TABLET ORAL DAILY
COMMUNITY

## 2024-10-10 ENCOUNTER — LAB VISIT (OUTPATIENT)
Dept: LAB | Facility: HOSPITAL | Age: 64
End: 2024-10-10
Attending: FAMILY MEDICINE
Payer: MEDICARE

## 2024-10-10 DIAGNOSIS — E78.2 MIXED HYPERLIPIDEMIA: ICD-10-CM

## 2024-10-10 DIAGNOSIS — R73.9 BLOOD GLUCOSE ELEVATED: ICD-10-CM

## 2024-10-10 LAB
ALBUMIN SERPL-MCNC: 4 G/DL (ref 3.4–4.8)
ALBUMIN/GLOB SERPL: 1.4 RATIO (ref 1.1–2)
ALP SERPL-CCNC: 95 UNIT/L (ref 40–150)
ALT SERPL-CCNC: 21 UNIT/L (ref 0–55)
ANION GAP SERPL CALC-SCNC: 8 MEQ/L
AST SERPL-CCNC: 20 UNIT/L (ref 5–34)
BACTERIA #/AREA URNS AUTO: ABNORMAL /HPF
BASOPHILS # BLD AUTO: 0.05 X10(3)/MCL
BASOPHILS NFR BLD AUTO: 0.8 %
BILIRUB SERPL-MCNC: 0.3 MG/DL
BILIRUB UR QL STRIP.AUTO: NEGATIVE
BUN SERPL-MCNC: 20.3 MG/DL (ref 9.8–20.1)
CALCIUM SERPL-MCNC: 9.4 MG/DL (ref 8.4–10.2)
CHLORIDE SERPL-SCNC: 106 MMOL/L (ref 98–107)
CHOLEST SERPL-MCNC: 239 MG/DL
CHOLEST/HDLC SERPL: 4 {RATIO} (ref 0–5)
CLARITY UR: CLEAR
CO2 SERPL-SCNC: 27 MMOL/L (ref 23–31)
COLOR UR AUTO: COLORLESS
CREAT SERPL-MCNC: 0.81 MG/DL (ref 0.55–1.02)
CREAT/UREA NIT SERPL: 25
EOSINOPHIL # BLD AUTO: 0.25 X10(3)/MCL (ref 0–0.9)
EOSINOPHIL NFR BLD AUTO: 4.1 %
ERYTHROCYTE [DISTWIDTH] IN BLOOD BY AUTOMATED COUNT: 13 % (ref 11.5–17)
EST. AVERAGE GLUCOSE BLD GHB EST-MCNC: 105.4 MG/DL
GFR SERPLBLD CREATININE-BSD FMLA CKD-EPI: >60 ML/MIN/1.73/M2
GLOBULIN SER-MCNC: 2.9 GM/DL (ref 2.4–3.5)
GLUCOSE SERPL-MCNC: 98 MG/DL (ref 82–115)
GLUCOSE UR QL STRIP: NORMAL
HBA1C MFR BLD: 5.3 %
HCT VFR BLD AUTO: 43.1 % (ref 37–47)
HDLC SERPL-MCNC: 65 MG/DL (ref 35–60)
HGB BLD-MCNC: 14.1 G/DL (ref 12–16)
HGB UR QL STRIP: NEGATIVE
IMM GRANULOCYTES # BLD AUTO: 0.01 X10(3)/MCL (ref 0–0.04)
IMM GRANULOCYTES NFR BLD AUTO: 0.2 %
KETONES UR QL STRIP: NEGATIVE
LDLC SERPL CALC-MCNC: 149 MG/DL (ref 50–140)
LEUKOCYTE ESTERASE UR QL STRIP: NEGATIVE
LYMPHOCYTES # BLD AUTO: 2.16 X10(3)/MCL (ref 0.6–4.6)
LYMPHOCYTES NFR BLD AUTO: 35.4 %
MCH RBC QN AUTO: 30.3 PG (ref 27–31)
MCHC RBC AUTO-ENTMCNC: 32.7 G/DL (ref 33–36)
MCV RBC AUTO: 92.7 FL (ref 80–94)
MONOCYTES # BLD AUTO: 0.58 X10(3)/MCL (ref 0.1–1.3)
MONOCYTES NFR BLD AUTO: 9.5 %
MUCOUS THREADS URNS QL MICRO: ABNORMAL /LPF
NEUTROPHILS # BLD AUTO: 3.06 X10(3)/MCL (ref 2.1–9.2)
NEUTROPHILS NFR BLD AUTO: 50 %
NITRITE UR QL STRIP: NEGATIVE
NRBC BLD AUTO-RTO: 0 %
PH UR STRIP: 6.5 [PH]
PLATELET # BLD AUTO: 243 X10(3)/MCL (ref 130–400)
PMV BLD AUTO: 10.7 FL (ref 7.4–10.4)
POTASSIUM SERPL-SCNC: 5.1 MMOL/L (ref 3.5–5.1)
PROT SERPL-MCNC: 6.9 GM/DL (ref 5.8–7.6)
PROT UR QL STRIP: NEGATIVE
RBC # BLD AUTO: 4.65 X10(6)/MCL (ref 4.2–5.4)
RBC #/AREA URNS AUTO: ABNORMAL /HPF
SODIUM SERPL-SCNC: 141 MMOL/L (ref 136–145)
SP GR UR STRIP.AUTO: 1.01 (ref 1–1.03)
SQUAMOUS #/AREA URNS LPF: ABNORMAL /HPF
TRIGL SERPL-MCNC: 125 MG/DL (ref 37–140)
TSH SERPL-ACNC: 2.02 UIU/ML (ref 0.35–4.94)
UROBILINOGEN UR STRIP-ACNC: NORMAL
VLDLC SERPL CALC-MCNC: 25 MG/DL
WBC # BLD AUTO: 6.11 X10(3)/MCL (ref 4.5–11.5)
WBC #/AREA URNS AUTO: ABNORMAL /HPF

## 2024-10-10 PROCEDURE — 80061 LIPID PANEL: CPT

## 2024-10-10 PROCEDURE — 81001 URINALYSIS AUTO W/SCOPE: CPT

## 2024-10-10 PROCEDURE — 81015 MICROSCOPIC EXAM OF URINE: CPT

## 2024-10-10 PROCEDURE — 80053 COMPREHEN METABOLIC PANEL: CPT

## 2024-10-10 PROCEDURE — 83036 HEMOGLOBIN GLYCOSYLATED A1C: CPT

## 2024-10-10 PROCEDURE — 85025 COMPLETE CBC W/AUTO DIFF WBC: CPT

## 2024-10-10 PROCEDURE — 84443 ASSAY THYROID STIM HORMONE: CPT

## 2024-10-10 PROCEDURE — 36415 COLL VENOUS BLD VENIPUNCTURE: CPT

## 2024-12-05 ENCOUNTER — PATIENT MESSAGE (OUTPATIENT)
Facility: CLINIC | Age: 64
End: 2024-12-05
Payer: MEDICARE

## 2024-12-06 ENCOUNTER — CLINICAL SUPPORT (OUTPATIENT)
Dept: NUTRITION | Facility: HOSPITAL | Age: 64
End: 2024-12-06
Payer: MEDICARE

## 2024-12-06 DIAGNOSIS — E78.2 MIXED HYPERLIPIDEMIA: ICD-10-CM

## 2024-12-06 PROCEDURE — 97802 MEDICAL NUTRITION INDIV IN: CPT

## 2024-12-06 NOTE — PROGRESS NOTES
"The patient location is: [place], [place] The chief complaint leading to consultation is: HLD   Visit type: audiovisual   Face to Face time with patient: 30 mintues 90 minutes of total time spent on the encounter, which includes face to face time and non-face to face time preparing to see the patient (eg, review of tests), Obtaining and/or reviewing separately obtained history, Documenting clinical information in the electronic or other health record, Independently interpreting results (not separately reported) and communicating results to the patient/family/caregiver, or Care coordination (not separately reported).    Each patient to whom he or she provides medical services by telemedicine is:  (1) informed of the relationship between the physician and patient and the respective role of any other health care provider with respect to management of the patient; and (2) notified that he or she may decline to receive medical services by telemedicine and may withdraw from such care at any time. Note below:         Nutrition Note: 2024   Referring Provider: Ivette Cordero MD  Reason for visit:  HLD  Consultation Time: 30 Minutes (1:35pm - 2:08pm)     A = NUTRITION ASSESSMENT   Patient Information:    Swapna Hayes  : 1960   64 y.o. female    Allergies/Intolerances: No known food allergies  Social Data: lives with spouse/significant Other. Accompanied by  Self .  Anthropometrics:     Wt:  24: 71.2 kg (157#)                                   Ht   162.6 cm (64")  BMI: 26.9  IBW: 54.5 kg (131% IBW)    Nutrition Risk: Patient DOES NOT meet at least 2 characteristics of the ASPEN/AND criteria for Malnutrition.    Energy Intake [Comment]: Does not meet criteria  Interpretation of Weight Loss [Comment]: Does not meet criteria  Physical Findings (Body Fat) [Comment]: Does not meet criteria  Physical Findings (Muscle Mass) [Comment]: Does not meet criteria  Fluid Accumulation [Comment ]: Unable to assess   "   Supplements/Vitamins:    MVI/Supp:  MVI, D3, Omega-Red  Drug/Nutrient interactions: Reviewed Activity Level:     Active      Form of Activity: exercise 6 days/wk; walking dog daily     Food/Nutrition-related hx:    Food Security  Is patient able to sufficiently able to prepare meals at home? [x] Yes  [] No []N/A  If no, does patient have help cooking, preparing, and serving meals at home? [] Yes  [] No [x] N/A    Diet/PO Recall:   Appetite: Good  Fluid Intake: Adequate  Diet Recall:  Breakfast: 1/4 everything bagel with PB (natural); 6oz milk (skim); Metamucil with water  Lunch: varies; kale salad with feta/goat cheese; boiled egg, pom seeds, nuts (edemame, pumpkin, almond); low-marcello dressing; whatever is available; grapes  Dinner: kale salad with baked chicken; grapes  Snacks: 1-2x/day; protein shake (Atkins), cup of coffee with sugar and cream, 1/2 celcius drink (2x/day)  Drinks: eggnog (during holidays); water (mostly); coke zero occasionally    N/V/C/D: No GI Symptoms Noted  Eating out: 0-1x/week.   Cultural/Spiritual/Personal Preferences: N/A    Patient Notes/Reports: 12/6/24: Pt present via audiovisual call for nutrition educ/counseling regarding HLD. Pt provided typical food recall listed above. Pt reports fly hx of HLD (mom and sister). Pt reports making dietary changes; consuming mostly healthy fat sources in place of saturated fat; incorporating higher fiber foods; etc. Pt reports being physically active; exercising 6x/wk and walking dog daily. Pt reports despite dietary changes and daily physical activity, chol levels and wt loss at stand still. Reviewed written materials provided. Discussed tracking/logging intake into food diary to compare current intake vs recommended intake. Plans to f/up in 2-3 months.     Medical Tests and Procedures:  Patient Active Problem List   Diagnosis    Primary osteoarthritis involving multiple joints    Mixed hyperlipidemia      Past Medical History:   Diagnosis Date     Mixed hyperlipidemia 7/15/2023    Primary osteoarthritis involving multiple joints 7/15/2023     Past Surgical History:   Procedure Laterality Date     SECTION      Closed treatment of trimalleolar ankle fracture; with manipulation (2018)      COLONOSCOPY  2014    KNEE SURGERY Bilateral 1993    Repair Right Hand Subcutaneous Tissue and Fascia, Open Approach (2018)      Reposition Right Fibula, External Approach (2018)      Reposition Right Tibia, External Approach (2018)      TOTAL KNEE ARTHROPLASTY Left 2022       Family History   Problem Relation Name Age of Onset    Hypertension Mother  94    Other Father  63        Blood Clot in Esophagus     Social History     Socioeconomic History    Marital status:      Spouse name: Jose    Number of children: 2   Occupational History    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   Tobacco Use    Smoking status: Never    Smokeless tobacco: Never   Substance and Sexual Activity    Alcohol use: Yes     Comment: occasional    Drug use: Never    Sexual activity: Yes     Partners: Male     Social Drivers of Health     Financial Resource Strain: Low Risk  (2024)    Overall Financial Resource Strain (CARDIA)     Difficulty of Paying Living Expenses: Not hard at all   Food Insecurity: No Food Insecurity (2024)    Hunger Vital Sign     Worried About Running Out of Food in the Last Year: Never true     Ran Out of Food in the Last Year: Never true   Transportation Needs: No Transportation Needs (2024)    TRANSPORTATION NEEDS     Transportation : No   Physical Activity: Sufficiently Active (2024)    Exercise Vital Sign     Days of Exercise per Week: 5 days     Minutes of Exercise per Session: 50 min   Stress: No Stress Concern Present (2024)    [employer] - Occupational Stress Questionnaire     Feeling of Stress : Not at all   Housing Stability: Low Risk  (2024)    Housing  Stability Vital Sign     Unable to Pay for Housing in the Last Year: No     Homeless in the Last Year: No     Current Outpatient Medications   Medication Instructions    multivitamin (THERAGRAN) per tablet 1 tablet, Oral, Daily      Labs:    Lab Results   Component Value Date    WBC 6.11 10/10/2024    HGB 14.1 10/10/2024    HCT 43.1 10/10/2024    CHOL 239 (H) 10/10/2024    TRIG 125 10/10/2024    HDL 65 (H) 10/10/2024    LDLCALC 168 (H) 09/07/2023    HGBA1C 5.3 10/10/2024     10/10/2024    K 5.1 10/10/2024    CALCIUM 9.4 10/10/2024         D = NUTRITION DIAGNOSIS    PES Statement:   Primary Problem: Altered nutrition-related lab values Chol  related to  chronic condition  as evidenced by  Chol 239 (H),  (H) .        I = NUTRITION INTERVENTION   Discussed healthy plate method on healthy eating, incorporating more fruits/vegetables, whole grains, and eliminating high calorie/sugary beverages.  Discussed sugary foods and/or beverages (potential health consequences of excessive sugar intake, ways to reduce sugar intake, healthy beverage alternatives, etc.). Discussed saturated fat and trans fat intake and examples of high fat foods to avoid (potential health consequences and recommendation to choose more heart healthy fats) Discussed with pt/family the need to ensure regular meals and snacks throughout the day.  Discussed nutrient-dense meals and snacks versus empty-calories.  Education provided on food labels and reading food labels to best meeting patient's needs using 5/20 rule.  Discussed recommended fiber intake and food sources of such. Discussed benefits of adequate hydration and recommended fluid intake. Discussed physical activity guidelines, per AAP, and its associated benefits. Complimented patient on physical activity efforts. Discussed importance of small behavior/habit changes in improving long-term adherence and sustainability.  Answered parents/patient's questions appropriately.      Patient   active and engaged during session and verbalized desire to make changes. Contact information provided, understanding verbalized and compliance expected.   Estimated Energy/Fluid Requirements:   Weight used: CBW 71.2 kg  Calories: 1780 kcal/day (25 kcal/kg)  Protein: 71 g/day (1.0 g/kg)  Fluid: 0578-2209 mL/day or 59-71 oz/day (25-30mL/kg) or per MD.    Education Materials Provided:   Nutrition Plan, Nutrition Facts Label, and LDL Lowering NT; Sterols/Stanols NT   Recommendations:   Ensure balanced eating pattern of 3 meals, 1-2 snacks daily. Avoid skipped meals.    Keep portions appropriate using body (fist, palm, etc)   Create nutrient-dense meals and snacks. Focus on adequate nutrition including lean proteins, whole grains/fiber, and increasing overall fruit/vegetable intake.  Gradually increase fiber intake to 25-35 grams per day.  Avoid/limit fried foods, fast food, and high calorie beverages. Shoot for limiting fast food to 1x/week.    Recommend fish/seafood up to 3x/week along with other omega-3 and omega-6 rich foods daily.    Ensure adequate fluid intake of 59-71 oz.    Consume zero-calorie, zero-sugary beverages and choose water more often (crystal light, unsweet tea, diet soda, G2, Powerade zero, vitamin water zero, and skim/1%milk)   Meet physical activity goal of at least 150 minutes per week.           M/E = NUTRITION MONITORING AND EVALUATION   Goal 1:  Gradual weight loss -- 10% (~16#) within 6 months  Indicator: Weight/BMI    Goal 2:  Patient follows prescribed meal patterns and labs show improvement within 3-6 months. (Chol/LDL,adherence to diet plan at meals/snacks)  Indicator: Diet Recall     F/U:  2-3 Months    Communication with provider via Epic    Signature: Keesha Suresh, MS, RDN, LDN

## 2024-12-09 NOTE — PATIENT INSTRUCTIONS
Recommendations:   Ensure balanced eating pattern of 3 meals, 1-2 snacks daily. Avoid skipped meals.    Keep portions appropriate using body (fist, palm, etc)   Create nutrient-dense meals and snacks. Focus on adequate nutrition including lean proteins, whole grains/fiber, and increasing overall fruit/vegetable intake.  Gradually increase fiber intake to 25-35 grams per day.  Avoid/limit fried foods, fast food, and high calorie beverages. Shoot for limiting fast food to 1x/week.    Recommend fish/seafood up to 3x/week along with other omega-3 and omega-6 rich foods daily.    Ensure adequate fluid intake of 59-71 oz.    Consume zero-calorie, zero-sugary beverages and choose water more often (crystal light, unsweet tea, diet soda, G2, Powerade zero, vitamin water zero, and skim/1%milk)   Meet physical activity goal of at least 150 minutes per week.

## 2025-02-13 ENCOUNTER — CLINICAL SUPPORT (OUTPATIENT)
Dept: NUTRITION | Facility: HOSPITAL | Age: 65
End: 2025-02-13
Payer: MEDICARE

## 2025-02-13 DIAGNOSIS — E78.2 MIXED HYPERLIPIDEMIA: Primary | ICD-10-CM

## 2025-02-13 NOTE — PROGRESS NOTES
The patient location is: [place], [place]  The chief complaint leading to consultation is: hyperlipidemia, wt gain    Visit type: audiovisual    Face to Face time with patient: 79 minutes  90 minutes of total time spent on the encounter, which includes face to face time and non-face to face time preparing to see the patient (eg, review of tests), Obtaining and/or reviewing separately obtained history, Documenting clinical information in the electronic or other health record, Independently interpreting results (not separately reported) and communicating results to the patient/family/caregiver, or Care coordination (not separately reported).         Each patient to whom he or she provides medical services by telemedicine is:  (1) informed of the relationship between the physician and patient and the respective role of any other health care provider with respect to management of the patient; and (2) notified that he or she may decline to receive medical services by telemedicine and may withdraw from such care at any time.    Notes: Nutrition Note: 2025   Referring Provider: No ref. provider found  Reason for visit: Obesity/Weight Management   Consultation Time:  79  Minutes     A = NUTRITION ASSESSMENT   Patient Information:    Swapna Hayes  : 1960   64 y.o. female    Allergies/Intolerances: No known food allergies  Social Data: lives with spouse/significant Other. Accompanied by  self .  Anthropometrics:     Wt: 71.2 kg                                    Ht   5'4  BMI: 26.8 m2  IBW: 54.5 kg (132% IBW)    Nutrition Risk: Patient doesn't meet  at least 2 characteristics of the ASPEN/AND criteria for Malnutrition    Energy Intake [ doesn't meet criteria ]:   Interpretation of Weight Loss [doesn't meet criteria]:   Physical Findings (Body Fat) [doesn't meet criteria]:   Physical Findings (Muscle Mass) [ doesn't meet criteria ]:   Fluid Accumulation [ unable to assess ]:   Other:      Supplements/Vitamins:    MVI/Supp: Reviewed  Drug/Nutrient interactions: Reviewed Activity Level:     Active      Form of Activity: Physical activity: 7 days per week. Pt does a variety of exercise, strength training, hit class, spin class, Eris, piliates.      Food/Nutrition-related hx:        Food Security  Is patient able to sufficiently able to prepare meals at home? [x] Yes  [] No []N/A  If no, does patient have help cooking, preparing, and serving meals at home? [] Yes  [] No [x] N/A    Diet/PO Recall:   Appetite: Good  Fluid Intake: Adequate  Diet Recall:  Breakfast: eggs with low fat cheese, coffee with stevia and cinnamon  Lunch: mexican casserole tortilla   Dinner: kale salad with eggs, pumpkin seeds, almonds, fruit  Snacks: 0-1x/day  Drinks: water, coffee  ONS:   N/V/C/D: No GI Symptoms Noted  Eating out: 1-2x/week.   Cultural/Spiritual/Personal Preferences:   Patient Notes/Reports:  Pt reports increase cholesterol and wt gain of 10# in last two years. Pt states follows different diet, such as weight watches, calorie count eunice, etc. Pt consumes 3 meals per day and sometimes a snack. See above. Intake is good and no issues wit GI. Following up with patient for wt management, last December 2024. Reviewed labs. Cholesterol elevated. Last labs in October 2024. Pt is active, does a variety of exercise each day. Educated on increase protein in diet, by consuming 3-4 ounces of meat and vegetable, small portion of starch, increasing fiber ( more vegetables in diet). Provide calorie reduction diet, 1500 kcal, 75 gm protein, 33 gm of fat, 169 gm carbohydrates per day. Will follow up in approximately 2 months.       Medical Tests and Procedures:  Patient Active Problem List   Diagnosis    Primary osteoarthritis involving multiple joints    Mixed hyperlipidemia      Past Medical History:   Diagnosis Date    Mixed hyperlipidemia 7/15/2023    Primary osteoarthritis involving multiple joints 7/15/2023     Past  Surgical History:   Procedure Laterality Date     SECTION      Closed treatment of trimalleolar ankle fracture; with manipulation (2018)      COLONOSCOPY  2014    KNEE SURGERY Bilateral 1993    Repair Right Hand Subcutaneous Tissue and Fascia, Open Approach (2018)      Reposition Right Fibula, External Approach (2018)      Reposition Right Tibia, External Approach (2018)      TOTAL KNEE ARTHROPLASTY Left 2022       Family History   Problem Relation Name Age of Onset    Hypertension Mother  94    Other Father  63        Blood Clot in Esophagus     Social History     Socioeconomic History    Marital status:      Spouse name: Jose    Number of children: 2   Occupational History    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   Tobacco Use    Smoking status: Never    Smokeless tobacco: Never   Substance and Sexual Activity    Alcohol use: Yes     Comment: occasional    Drug use: Never    Sexual activity: Yes     Partners: Male     Social Drivers of Health     Financial Resource Strain: Low Risk  (2024)    Overall Financial Resource Strain (CARDIA)     Difficulty of Paying Living Expenses: Not hard at all   Food Insecurity: No Food Insecurity (2024)    Hunger Vital Sign     Worried About Running Out of Food in the Last Year: Never true     Ran Out of Food in the Last Year: Never true   Transportation Needs: No Transportation Needs (2024)    TRANSPORTATION NEEDS     Transportation : No   Physical Activity: Sufficiently Active (2024)    Exercise Vital Sign     Days of Exercise per Week: 5 days     Minutes of Exercise per Session: 50 min   Stress: No Stress Concern Present (2024)    [employer] [employer] - Occupational Stress Questionnaire     Feeling of Stress : Not at all   Housing Stability: Unknown (2024)    Housing Stability Vital Sign     Unable to Pay for Housing in the Last Year: No     Homeless in the Last Year: No      Current Outpatient Medications   Medication Instructions    multivitamin (THERAGRAN) per tablet 1 tablet, Oral, Daily      Labs:    Lab Results   Component Value Date    WBC 6.11 10/10/2024    HGB 14.1 10/10/2024    HCT 43.1 10/10/2024    CHOL 239 (H) 10/10/2024    TRIG 125 10/10/2024    HDL 65 (H) 10/10/2024    LDLCALC 168 (H) 09/07/2023    HGBA1C 5.3 10/10/2024     10/10/2024    K 5.1 10/10/2024    CALCIUM 9.4 10/10/2024         D = NUTRITION DIAGNOSIS    PES Statement:   Primary Problem: Food and nutrition related knowledge deficit  related to  protein needs  as evidenced by diet recall.          I = NUTRITION INTERVENTION   Discussed healthy plate method on healthy eating, incorporating more fruits/vegetables, whole grains, and eliminating high calorie/sugary beverages.  Discussed recommended servings of fruit/vegetable per day and food sources of such at age appropriate serving sizes.  Discussed with pt/family the need to ensure regular meals and snacks throughout the day.  Discussed recommended fiber intake and food sources of such. Discussed benefits of adequate hydration and recommended fluid intake.  Answered parents/patient's questions appropriately.      Patient  active and engaged during session and verbalized desire to make changes. Contact information provided, understanding verbalized and compliance expected.   Estimated Energy/Fluid Requirements:   Weight used: CBW 71.2 kg  Calories: 1424 kcal/day (20 kcal/kg)  Protein: 71 g/day (1.0 g/kg)  Fluid: 1780 mL/day or 61 oz/day     Education Materials Provided:   Nutrition Plan   Recommendations:   Ensure balanced eating pattern of 3 meals, 1-2 snacks daily. Avoid skipped meals.    Keep portions appropriate using body (fist, palm, etc)   Create nutrient-dense meals and snacks. Focus on adequate nutrition including lean proteins, whole grains/fiber, and increasing overall fruit/vegetable intake.    Avoid/limit fried foods, fast food, and high  calorie beverages. Shoot for limiting fast food to 1x/week.    Meet physical activity goal of 60 minutes daily.    Focus on protein at all meals and snacks. Examples provided.    Use positive reinforcement around trying new foods.    Total provides: 1780 mL (25 mL/kg), 1424 kcal (20 kcal/kg), & 71 g prot (1.0 g/kg)      M/E = NUTRITION MONITORING AND EVALUATION   Goal 1: Wt loss of 7.8 in next 2-3 months  Indicator: Weight/BMI    Goal 2: Diet recall shows decrease/improvements in high calorie foods/drinks and consuming balanced eating pattern including lean proteins, whole grains, and fruit/vegetables by next RD visit.   Indicator: Diet Recall     F/U:   2 Months    Communication with provider via Epic    Signature: Cristela Soto RDN, LDN

## 2025-03-18 ENCOUNTER — OFFICE VISIT (OUTPATIENT)
Dept: URGENT CARE | Facility: CLINIC | Age: 65
End: 2025-03-18
Payer: MEDICARE

## 2025-03-18 ENCOUNTER — RESULTS FOLLOW-UP (OUTPATIENT)
Dept: URGENT CARE | Facility: CLINIC | Age: 65
End: 2025-03-18

## 2025-03-18 VITALS
BODY MASS INDEX: 26.8 KG/M2 | WEIGHT: 157 LBS | OXYGEN SATURATION: 99 % | DIASTOLIC BLOOD PRESSURE: 69 MMHG | SYSTOLIC BLOOD PRESSURE: 142 MMHG | TEMPERATURE: 98 F | HEIGHT: 64 IN | HEART RATE: 82 BPM | RESPIRATION RATE: 16 BRPM

## 2025-03-18 DIAGNOSIS — M79.672 LEFT FOOT PAIN: Primary | ICD-10-CM

## 2025-03-18 DIAGNOSIS — M79.672 PAIN OF LEFT HEEL: ICD-10-CM

## 2025-03-18 DIAGNOSIS — M72.2 PLANTAR FASCIITIS OF LEFT FOOT: ICD-10-CM

## 2025-03-18 PROCEDURE — 99214 OFFICE O/P EST MOD 30 MIN: CPT | Mod: ,,, | Performed by: FAMILY MEDICINE

## 2025-03-18 RX ORDER — DICLOFENAC SODIUM 75 MG/1
75 TABLET, DELAYED RELEASE ORAL 2 TIMES DAILY PRN
Qty: 16 TABLET | Refills: 0 | Status: SHIPPED | OUTPATIENT
Start: 2025-03-18

## 2025-03-18 NOTE — PROGRESS NOTES
"Patient ID: Swapna Hayes is a 65 y.o. female.  Chief Complaint: Heel Pain    HPI:   Patient presents here today for above reason.      Patient is a 65 y.o. female who presents to urgent care with complaints of L heel pain x1 months. Alleviating factors include aleve, tiger balm, voltaren with mild amount of relief. Patient denies any injury to her heel/foot. Pt does have a brace that she wears at night.     Past Medical History:  Past Medical History:   Diagnosis Date    Mixed hyperlipidemia 7/15/2023    Primary osteoarthritis involving multiple joints 7/15/2023     Past Surgical History:   Procedure Laterality Date     SECTION      Closed treatment of trimalleolar ankle fracture; with manipulation (2018)      COLONOSCOPY  2014    KNEE SURGERY Bilateral 1993    Repair Right Hand Subcutaneous Tissue and Fascia, Open Approach (2018)      Reposition Right Fibula, External Approach (2018)      Reposition Right Tibia, External Approach (2018)      TOTAL KNEE ARTHROPLASTY Left 2022     Review of patient's allergies indicates:  No Known Allergies  Current Outpatient Medications   Medication Instructions    diclofenac (VOLTAREN) 75 mg, Oral, 2 times daily PRN    multivitamin (THERAGRAN) per tablet 1 tablet, Oral, Daily     Social History[1]    ROS:   Review of Systems  12 point review of systems conducted, negative except as stated in the history of present illness. See HPI for details.   Vitals/PE:   Visit Vitals  BP (!) 142/69 (Patient Position: Sitting)   Pulse 82   Temp 98.2 °F (36.8 °C)   Resp 16   Ht 5' 4" (1.626 m)   Wt 71.2 kg (157 lb)   SpO2 99%   BMI 26.95 kg/m²     Physical Exam  Vitals and nursing note reviewed.   Constitutional:       General: She is not in acute distress.     Appearance: Normal appearance. She is not ill-appearing, toxic-appearing or diaphoretic.   HENT:      Head: Normocephalic and atraumatic.      Right Ear: Tympanic membrane normal.      Left " Ear: Tympanic membrane normal.      Mouth/Throat:      Mouth: Mucous membranes are dry.      Pharynx: Oropharynx is clear.   Eyes:      Extraocular Movements: Extraocular movements intact.      Conjunctiva/sclera: Conjunctivae normal.      Pupils: Pupils are equal, round, and reactive to light.   Neck:      Vascular: No carotid bruit.   Cardiovascular:      Rate and Rhythm: Normal rate and regular rhythm.      Pulses: Normal pulses.      Heart sounds: Normal heart sounds. No murmur heard.     No friction rub. No gallop.   Pulmonary:      Effort: Pulmonary effort is normal. No respiratory distress.      Breath sounds: No stridor. No wheezing or rhonchi.   Abdominal:      General: There is no distension.      Palpations: There is no mass.      Tenderness: There is no abdominal tenderness. There is no left CVA tenderness, guarding or rebound.      Hernia: No hernia is present.   Musculoskeletal:         General: No swelling or signs of injury. Normal range of motion.      Cervical back: Normal range of motion and neck supple. No rigidity or tenderness.      Right lower leg: No edema.      Left lower leg: No edema.        Feet:    Lymphadenopathy:      Cervical: No cervical adenopathy.   Skin:     Capillary Refill: Capillary refill takes less than 2 seconds.      Coloration: Skin is not jaundiced.      Findings: No bruising, lesion or rash.   Neurological:      General: No focal deficit present.      Mental Status: She is alert and oriented to person, place, and time. Mental status is at baseline.      Cranial Nerves: No cranial nerve deficit.      Sensory: No sensory deficit.      Motor: No weakness.      Coordination: Coordination normal.      Gait: Gait normal.   Psychiatric:         Mood and Affect: Mood normal.         Behavior: Behavior normal.         Thought Content: Thought content normal.         Judgment: Judgment normal.         Assessment/Plan:   Left foot pain  -     XR FOOT COMPLETE 3 VIEW LEFT; Future;  Expected date: 03/18/2025  -     diclofenac (VOLTAREN) 75 MG EC tablet; Take 1 tablet (75 mg total) by mouth 2 (two) times daily as needed (foot pain).  Dispense: 16 tablet; Refill: 0  Calcaneal bursitis versus plantar fasciitis versus other.  Referral to podiatrist.  Pain of left heel  -     XR FOOT COMPLETE 3 VIEW LEFT; Future; Expected date: 03/18/2025  -     diclofenac (VOLTAREN) 75 MG EC tablet; Take 1 tablet (75 mg total) by mouth 2 (two) times daily as needed (foot pain).  Dispense: 16 tablet; Refill: 0    Plantar fasciitis of left foot  -     diclofenac (VOLTAREN) 75 MG EC tablet; Take 1 tablet (75 mg total) by mouth 2 (two) times daily as needed (foot pain).  Dispense: 16 tablet; Refill: 0       Orders Placed This Encounter   Procedures    XR FOOT COMPLETE 3 VIEW LEFT    Ambulatory referral/consult to Podiatry       Education and counseling done face to face regarding medical conditions and plan. Contact office if new symptoms develop. Should any symptoms ever significantly worsen seek emergency medical attention/go to ER. Follow up at least yearly for wellness or sooner PRN. Nurse to call patient with any results. The patient is receptive, expresses understanding and is agreeable to plan. All questions have been answered.             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   Tobacco Use    Smoking status: Never    Smokeless tobacco: Never   Substance and Sexual Activity    Alcohol use: Yes     Comment: occasional    Drug use: Never    Sexual activity: Yes     Partners: Male     Social Drivers of Health     Financial Resource Strain: Low Risk  (9/17/2024)    Overall Financial Resource Strain (CARDIA)     Difficulty of Paying Living Expenses: Not hard at all   Food Insecurity: No Food Insecurity (9/17/2024)    Hunger Vital Sign     Worried About Running Out of Food  in the Last Year: Never true     Ran Out of Food in the Last Year: Never true   Transportation Needs: No Transportation Needs (9/17/2024)    TRANSPORTATION NEEDS     Transportation : No   Physical Activity: Sufficiently Active (9/17/2024)    Exercise Vital Sign     Days of Exercise per Week: 5 days     Minutes of Exercise per Session: 50 min   Stress: No Stress Concern Present (9/17/2024)    [employer] [place] [employer] - Occupational Stress Questionnaire     Feeling of Stress : Not at all   Housing Stability: Unknown (9/17/2024)    Housing Stability Vital Sign     Unable to Pay for Housing in the Last Year: No     Homeless in the Last Year: No

## 2025-03-18 NOTE — PATIENT INSTRUCTIONS
Assessment/Plan:   Left foot pain  -     XR FOOT COMPLETE 3 VIEW LEFT; Future; Expected date: 03/18/2025  -     diclofenac (VOLTAREN) 75 MG EC tablet; Take 1 tablet (75 mg total) by mouth 2 (two) times daily as needed (foot pain).  Dispense: 16 tablet; Refill: 0  Calcaneal bursitis versus plantar fasciitis versus other.  Referral to podiatrist.  Pain of left heel  -     XR FOOT COMPLETE 3 VIEW LEFT; Future; Expected date: 03/18/2025  -     diclofenac (VOLTAREN) 75 MG EC tablet; Take 1 tablet (75 mg total) by mouth 2 (two) times daily as needed (foot pain).  Dispense: 16 tablet; Refill: 0    Plantar fasciitis of left foot  -     diclofenac (VOLTAREN) 75 MG EC tablet; Take 1 tablet (75 mg total) by mouth 2 (two) times daily as needed (foot pain).  Dispense: 16 tablet; Refill: 0       Orders Placed This Encounter   Procedures    XR FOOT COMPLETE 3 VIEW LEFT    Ambulatory referral/consult to Podiatry       Education and counseling done face to face regarding medical conditions and plan. Contact office if new symptoms develop. Should any symptoms ever significantly worsen seek emergency medical attention/go to ER.

## 2025-03-19 NOTE — PROGRESS NOTES
Please notify patient.  Radiologist degrees with preliminary interpretation of imaging reviewed discussed today during urgent care.  Recommend continuing with the plan as discussed in urgent care.  Etc..

## 2025-04-25 ENCOUNTER — CLINICAL SUPPORT (OUTPATIENT)
Dept: NUTRITION | Facility: HOSPITAL | Age: 65
End: 2025-04-25
Payer: MEDICARE

## 2025-04-25 DIAGNOSIS — R63.5 WEIGHT GAIN: Primary | ICD-10-CM

## 2025-04-25 NOTE — PROGRESS NOTES
The patient location is: [place], [place]  The chief complaint leading to consultation is: hyperlipidemia, wt gain    Visit type: audiovisual    Face to Face time with patient: 72 minutes  90 minutes of total time spent on the encounter, which includes face to face time and non-face to face time preparing to see the patient (eg, review of tests), Obtaining and/or reviewing separately obtained history, Documenting clinical information in the electronic or other health record, Independently interpreting results (not separately reported) and communicating results to the patient/family/caregiver, or Care coordination (not separately reported).         Each patient to whom he or she provides medical services by telemedicine is:  (1) informed of the relationship between the physician and patient and the respective role of any other health care provider with respect to management of the patient; and (2) notified that he or she may decline to receive medical services by telemedicine and may withdraw from such care at any time.    Notes: Nutrition Note: 2025   Referring Provider: No ref. provider found  Reason for visit: Obesity/Weight Management   Consultation Time:  72  Minutes     A = NUTRITION ASSESSMENT   Patient Information:    Swapna Hayes  : 1960   65 y.o. female    Allergies/Intolerances: No known food allergies  Social Data: lives with spouse/significant Other. Accompanied by  self .  Anthropometrics:     Wt: 71.2 kg                                    Ht   5'4  BMI: 26.8 m2  IBW: 54.5 kg (132% IBW)    Nutrition Risk: Patient doesn't meet  at least 2 characteristics of the ASPEN/AND criteria for Malnutrition    Energy Intake [ doesn't meet criteria ]:   Interpretation of Weight Loss [doesn't meet criteria]:   Physical Findings (Body Fat) [doesn't meet criteria]:   Physical Findings (Muscle Mass) [ doesn't meet criteria ]:   Fluid Accumulation [ unable to assess ]:   Other:      Supplements/Vitamins:    MVI/Supp: Reviewed  Drug/Nutrient interactions: Reviewed Activity Level:     Active      Form of Activity: Physical activity: 6 days per week. Pt does a variety of cardio and weights, strength training, core/barre.      Food/Nutrition-related hx:        Food Security  Is patient able to sufficiently able to prepare meals at home? [x] Yes  [] No []N/A  If no, does patient have help cooking, preparing, and serving meals at home? [] Yes  [] No [x] N/A    Diet/PO Recall:   Appetite: Good  Fluid Intake: Adequate  Diet Recall:  Breakfast: smoothie (fruit, spinach, water, radha seeds, protein powder)  Lunch: [employer] pizza [employer] (2 slices)  with  kale salad with eggs, pumpkin seeds, almonds, fruit  Dinner: kale salad with eggs, pumpkin seeds, almonds, fruit  Snacks: 1-2x/day, baby carrots 10  Drinks: water, coffee  ONS:   N/V/C/D: No GI Symptoms Noted  Eating out: 0-1x/week.Doesn't remember last time she ate fast food  Cultural/Spiritual/Personal Preferences:   Patient Notes/Reports:   04/25/25: Follow up visit for weight management. Pt reports no significant wt change. Current weight 71.3 kg. Previous wt on 02/13/25: 71.2 kg. Pt reports changing meals, see diet recall. Pt also have been dealing with foot pain. Pt still working out about 6  days per week. Denies any GI issues. Educate pt on portion control sizes, wt reduction diet/ 1500 kcal per day. Encourage to keep a log and portion control to help with losing weight.  No new labs order. Mailing handouts to patient. Will follow up in about 2 months.     Pt reports increase cholesterol and wt gain of 10# in last two years. Pt states follows different diet, such as weight watches, calorie count eunice, etc. Pt consumes 3 meals per day and sometimes a snack. See above. Intake is good and no issues wit GI. Following up with patient for wt management, last December 2024. Reviewed labs. Cholesterol elevated. Last labs in October 2024. Pt is active,  does a variety of exercise each day. Educated on increase protein in diet, by consuming 3-4 ounces of meat and vegetable, small portion of starch, increasing fiber ( more vegetables in diet). Provide calorie reduction diet, 1500 kcal, 75 gm protein, 33 gm of fat, 169 gm carbohydrates per day. Will follow up in approximately 2 months.       Medical Tests and Procedures:  Patient Active Problem List   Diagnosis    Primary osteoarthritis involving multiple joints    Mixed hyperlipidemia      Past Medical History:   Diagnosis Date    Mixed hyperlipidemia 7/15/2023    Primary osteoarthritis involving multiple joints 7/15/2023     Past Surgical History:   Procedure Laterality Date     SECTION      Closed treatment of trimalleolar ankle fracture; with manipulation (2018)      COLONOSCOPY  2014    KNEE SURGERY Bilateral 1993    Repair Right Hand Subcutaneous Tissue and Fascia, Open Approach (2018)      Reposition Right Fibula, External Approach (2018)      Reposition Right Tibia, External Approach (2018)      TOTAL KNEE ARTHROPLASTY Left 2022       Family History   Problem Relation Name Age of Onset    Hypertension Mother  94    Other Father  63        Blood Clot in Esophagus     Social History     Socioeconomic History    Marital status:      Spouse name: Jose    Number of children: 2   Occupational History    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   Tobacco Use    Smoking status: Never    Smokeless tobacco: Never   Substance and Sexual Activity    Alcohol use: Yes     Comment: occasional    Drug use: Never    Sexual activity: Yes     Partners: Male     Social Drivers of Health     Financial Resource Strain: Low Risk  (2024)    Overall Financial Resource Strain (CARDIA)     Difficulty of Paying Living Expenses: Not hard at all   Food Insecurity: No Food Insecurity (2024)    Hunger Vital Sign     Worried About Running Out of Food in the Last Year: Never true      Ran Out of Food in the Last Year: Never true   Transportation Needs: No Transportation Needs (9/17/2024)    TRANSPORTATION NEEDS     Transportation : No   Physical Activity: Sufficiently Active (9/17/2024)    Exercise Vital Sign     Days of Exercise per Week: 5 days     Minutes of Exercise per Session: 50 min   Stress: No Stress Concern Present (9/17/2024)    [place] [employer] - Occupational Stress Questionnaire     Feeling of Stress : Not at all   Housing Stability: Unknown (9/17/2024)    Housing Stability Vital Sign     Unable to Pay for Housing in the Last Year: No     Homeless in the Last Year: No     Current Outpatient Medications   Medication Instructions    diclofenac (VOLTAREN) 75 mg, Oral, 2 times daily PRN    multivitamin (THERAGRAN) per tablet 1 tablet, Oral, Daily      Labs:    Lab Results   Component Value Date    WBC 6.11 10/10/2024    HGB 14.1 10/10/2024    HCT 43.1 10/10/2024    CHOL 239 (H) 10/10/2024    TRIG 125 10/10/2024    HDL 65 (H) 10/10/2024    LDLCALC 168 (H) 09/07/2023    HGBA1C 5.3 10/10/2024     10/10/2024    K 5.1 10/10/2024    CALCIUM 9.4 10/10/2024         D = NUTRITION DIAGNOSIS    PES Statement:   Primary Problem: Food and nutrition related knowledge deficit  related to  protein needs  as evidenced by diet recall.          I = NUTRITION INTERVENTION   Discussed healthy plate method on healthy eating, incorporating more fruits/vegetables, whole grains, and eliminating high calorie/sugary beverages.  Discussed recommended servings of fruit/vegetable per day and food sources of such at age appropriate serving sizes.  Discussed with pt/family the need to ensure regular meals and snacks throughout the day.  Discussed recommended fiber intake and food sources of such. Discussed benefits of adequate hydration and recommended fluid intake.  Answered parents/patient's questions appropriately.      Patient  active and engaged during session and verbalized desire to  make changes. Contact information provided, understanding verbalized and compliance expected.   Estimated Energy/Fluid Requirements:   Weight used: CBW 71.3 kg  Calories: 1424 kcal/day (20 kcal/kg)  Protein: 71 g/day (1.0 g/kg)  Fluid: 1783 mL/day or 61 oz/day  (25 mL/kg)   Education Materials Provided:   Nutrition Plan   Recommendations:   Ensure balanced eating pattern of 3 meals, 1-2 snacks daily. Avoid skipped meals.    Keep portions appropriate using body (fist, palm, etc)   Create nutrient-dense meals and snacks. Focus on adequate nutrition including lean proteins, whole grains/fiber, and increasing overall fruit/vegetable intake.    Avoid/limit fried foods, fast food, and high calorie beverages. Shoot for limiting fast food to 1x/week.    Meet physical activity goal of 60 minutes daily.    Focus on protein at all meals and snacks. Examples provided.    Use positive reinforcement around trying new foods.    Total provides: 1783 mL (25 mL/kg), 1424 kcal (20 kcal/kg), & 71 g prot (1.0 g/kg)      M/E = NUTRITION MONITORING AND EVALUATION   Goal 1: Wt loss of 7.8 # in next 2-3 months  Indicator: Weight/BMI (continues)    Goal 2: Diet recall shows decrease/improvements in high calorie foods/drinks and consuming balanced eating pattern including lean proteins, whole grains, and fruit/vegetables by next RD visit.   Indicator: Diet Recall  (continues)    F/U:   2 Months    Communication with provider via Epic    Signature: Cristela Soto  RDN, LDN

## 2025-06-06 ENCOUNTER — CLINICAL SUPPORT (OUTPATIENT)
Dept: NUTRITION | Facility: HOSPITAL | Age: 65
End: 2025-06-06
Payer: MEDICARE

## 2025-06-06 DIAGNOSIS — E78.2 MIXED HYPERLIPIDEMIA: Primary | ICD-10-CM

## 2025-06-06 PROCEDURE — 97803 MED NUTRITION INDIV SUBSEQ: CPT

## 2025-06-14 ENCOUNTER — OFFICE VISIT (OUTPATIENT)
Dept: URGENT CARE | Facility: CLINIC | Age: 65
End: 2025-06-14
Payer: MEDICARE

## 2025-06-14 VITALS
RESPIRATION RATE: 20 BRPM | SYSTOLIC BLOOD PRESSURE: 117 MMHG | DIASTOLIC BLOOD PRESSURE: 77 MMHG | OXYGEN SATURATION: 96 % | TEMPERATURE: 99 F | HEART RATE: 104 BPM

## 2025-06-14 DIAGNOSIS — J02.9 SORE THROAT: ICD-10-CM

## 2025-06-14 DIAGNOSIS — J02.9 VIRAL PHARYNGITIS: Primary | ICD-10-CM

## 2025-06-14 DIAGNOSIS — J98.8 VIRAL RESPIRATORY INFECTION: ICD-10-CM

## 2025-06-14 DIAGNOSIS — B97.89 VIRAL RESPIRATORY INFECTION: ICD-10-CM

## 2025-06-14 LAB
CTP QC/QA: YES
MOLECULAR STREP A: NEGATIVE

## 2025-06-14 PROCEDURE — 87651 STREP A DNA AMP PROBE: CPT | Mod: QW,,, | Performed by: FAMILY MEDICINE

## 2025-06-14 PROCEDURE — 96372 THER/PROPH/DIAG INJ SC/IM: CPT | Mod: ,,, | Performed by: FAMILY MEDICINE

## 2025-06-14 PROCEDURE — 99213 OFFICE O/P EST LOW 20 MIN: CPT | Mod: 25,,, | Performed by: FAMILY MEDICINE

## 2025-06-14 RX ORDER — BETAMETHASONE SODIUM PHOSPHATE AND BETAMETHASONE ACETATE 3; 3 MG/ML; MG/ML
9 INJECTION, SUSPENSION INTRA-ARTICULAR; INTRALESIONAL; INTRAMUSCULAR; SOFT TISSUE
Status: COMPLETED | OUTPATIENT
Start: 2025-06-14 | End: 2025-06-14

## 2025-06-14 RX ADMIN — BETAMETHASONE SODIUM PHOSPHATE AND BETAMETHASONE ACETATE 9 MG: 3; 3 INJECTION, SUSPENSION INTRA-ARTICULAR; INTRALESIONAL; INTRAMUSCULAR; SOFT TISSUE at 08:06

## 2025-06-14 NOTE — PROGRESS NOTES
Ambulatory Care Social Work   Follow Up Note  1/13/2023     Goals Addressed                      This Visit's Progress      Connect with Keith Durham (pt-stated)   On track      01/13/23  Reviewed the list of community behavioral health providers, compiled by Whitney Amaral, supervisor at [facility]. Called [facility], and spoke with Amelia. She explained that [facility] has two providers in [place] that accept Medicare patients - [facility], in [place], and [facility], in [place]. Both providers can provide virtual services. Called [facility], but learned from their voicemail that they do not see patients over the age of 61. Next, called [facility], and spoke with Radha. She explained that they have a provider, Dr. Madhavi Burnett, who is not currently accepting new clients, but offered a waiting list.  Michael Sarath explained that it could take up to a month to get called about an appointment. Called the patient, who declined the virtual psychiatry options through [address] [place] [address]. Patient was agreeable to getting on Dr. Nini Schwab waiting list.  She explained that she prefers a female psychiatrist.  Dino Wootenl back, via conference call with the patient. She is now on the waiting list with Dr. Madhavi Burnett. The patient plans to research the location of [facility] ([place] [address], [address] - [phone]) and confirm with family/friends that someone would be able to drive her to that location for a future appointment. Patient thanked this  for her assistance. Plan:  Ongoing psychosocial support and resource referral, as desired by the patient and family. This  will follow up with the patient again next week, to check on her status. [facility]     01/06/23  Connected with the patient via phone today.   She shared that the  is there 5 days a week now, which has relieved some Patient ID: Swapna Hayes is a 65 y.o. female.  Chief Complaint: Sore Throat (Sore throat 2 days, lt ear ringing)    HPI:   Patient presents here today for above reason.     Patient is a 65 y.o. female who presents to urgent care with complaints of sore throat and lt ear ringing x2 days. Alleviating factors include nyquil with no relief. Patient denies fever or sinus congetion..      Past Medical History:  Past Medical History:   Diagnosis Date    Mixed hyperlipidemia 7/15/2023    Primary osteoarthritis involving multiple joints 7/15/2023     Past Surgical History:   Procedure Laterality Date     SECTION      Closed treatment of trimalleolar ankle fracture; with manipulation (2018)      COLONOSCOPY  2014    KNEE SURGERY Bilateral 1993    Repair Right Hand Subcutaneous Tissue and Fascia, Open Approach (2018)      Reposition Right Fibula, External Approach (2018)      Reposition Right Tibia, External Approach (2018)      TOTAL KNEE ARTHROPLASTY Left 2022     Review of patient's allergies indicates:  No Known Allergies  No current outpatient medications  Social History[1]    ROS:   Review of Systems  12 point review of systems conducted, negative except as stated in the history of present illness. See HPI for details.   Vitals/PE:   Visit Vitals  /77   Pulse 104   Temp 98.6 °F (37 °C)   Resp 20   SpO2 96%     Physical Exam  Vitals and nursing note reviewed.   Constitutional:       Appearance: She is ill-appearing. She is not toxic-appearing or diaphoretic.   HENT:      Right Ear: Tympanic membrane normal.      Left Ear: Tympanic membrane normal.      Nose: Mucosal edema and congestion present.      Right Turbinates: Enlarged and swollen.      Left Turbinates: Enlarged and swollen.      Right Sinus: Maxillary sinus tenderness and frontal sinus tenderness present.      Left Sinus: Maxillary sinus tenderness and frontal sinus tenderness present.      Mouth/Throat:       "Pharynx: Posterior oropharyngeal erythema present.   Eyes:      Pupils: Pupils are equal, round, and reactive to light.   Cardiovascular:      Rate and Rhythm: Normal rate.      Pulses: Normal pulses.   Pulmonary:      Effort: Pulmonary effort is normal.   Skin:     General: Skin is warm.      Capillary Refill: Capillary refill takes less than 2 seconds.   Neurological:      General: No focal deficit present.      Mental Status: She is alert and oriented to person, place, and time.   Psychiatric:         Mood and Affect: Mood normal.         Results for orders placed or performed in visit on 06/14/25   POCT Strep A, Molecular    Collection Time: 06/14/25  8:46 AM   Result Value Ref Range    Molecular Strep A, POC Negative Negative     Acceptable Yes      Assessment/Plan:   Viral pharyngitis  -     betamethasone acetate-betamethasone sodium phosphate injection 9 mg    Sore throat  -     POCT Strep A, Molecular    Viral respiratory infection     Sinusitis   If your condition worsens or fails to improve we recommend that you receive another evaluation at the ER immediately or contact your PCP to discuss your concerns or return here. You must understand that you've received an urgent care treatment only and that you may be released before all your medical problems are known or treated. You the patient will arrange for followup care as instructed.   If we discussed that I think your illness is viral it will not respond to antibiotics and it will last 10-14 days. Flonase (fluticasone) is a nasal spray which is available over the counter and may help with your symptoms, Asterpro (Azelastine) is a nasal antihistamine that can also be taken    Zyrtec D, Claritin D or allegra D can also help with symptoms of congestion and drainage.   If you have hypertension avoid using the "D" which is the decongestant   If you just have drainage you can take plain zyrtec, claritin or allegra   If you just have a congested " of her stress. As noted yesterday, she visited [facility] on 12/28/2022, but was turned away, and given a list of community providers. Patient reports that she misplaced the list provided to her by [facility]. Offered the names/information of three providers that accept Medicare (Dr. Nia Jimenes, Dr. Samuel Dooley, and Bruno Watson CNP), two in [place], one in [place], [place]. The patient explained that the distance would not work, as she relies on family to drive her to appointments, and would prefer a provider closer to her home. Aaron Villafueret again today, to request a copy of the list of providers, given to the patient during her visit on 12/28/22. Spoke with Fabiola Tirado, clinical supervisor for the Same Day Access Program.  She confirmed that they are unable to see any new patients at this time, unless they have had a recent inpatient hospitalization. She e-mailed a list of [facility]. Providers to this . Plan:  Ongoing psychosocial support and resource referral, as desired by the patient and family. This  will follow up with the patient again next week, to check on her status, and assist her in connecting with a psychiatrist.    [facility]     01/05/23  Called to follow up with the patient, but was unable to connect with her today. Left a voicemail message. Per chart review, she visited [facility] Day Access on 12/28/2022, but was turned away, and given a list of community providers.   Aaron Villafuerte and spoke with a representative, Pratibha Schwab, who advised that they are short staffed, and recommended that the patient contact the providers on the list they gave her, rather than make another visit/attempt with their same day access program.  Left a voicemail for [facility] , Anika Stevens, to request a copy of that list, in order to be able feeling you can take pseudoephedrine (unless you have high blood pressure) which you have to sign for behind the counter. Do not buy the phenylephrine which is on the shelf as it is not effective   Rest and fluids are also important.   Tylenol or ibuprofen can also be used as directed for pain unless you have an allergy to them or medical condition such as stomach ulcers, kidney or liver disease or blood thinners etc for which you should not be taking these type of medications.   If you are flying in the next few days Afrin nose drops for the airplane flight upon take off and landing may help. Other than at those times refrain from using afrin.   If you were prescribed a narcotic do not drive or operate heavy machinery while taking these medications.    Orders Placed This Encounter   Procedures    POCT Strep A, Molecular       Education and counseling done face to face regarding medical conditions and plan. Contact office if new symptoms develop. Should any symptoms ever significantly worsen seek emergency medical attention/go to ER. Follow up at least yearly for wellness or sooner PRN. Nurse to call patient with any results. The patient is receptive, expresses understanding and is agreeable to plan. All questions have been answered.             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 Awaiting a call back. Plan:  Ongoing psychosocial support and resource referral, as desired by the patient and family. This  will attempt to reach the patient tomorrow. EPC     12/22/22  Connected with the patient via phone today. Introduced self and role, and offered support. Patient described caregiver burden, as her  has had multiple health issues, hospitalizations, surgeries, and a fall over the past year. She reports anxiety and trouble sleeping. Discussed good sleep hygiene habits including going to sleep and waking up at the same times each day, limiting exposure to electric devices prior to sleep, and use of aromatherapy such as lavender. Patient identified securing an appointment with a psychiatrist for medication management as her primary concern today. She saw a psychiatrist in the 1990s for medication management, but that person is no longer in practice. Provided information for [facility] program.  Explained that they do not offer appointments for screenings, but there are walk-in hours ( Monday through Wednesday: 7:30 to 3:00PM, Thursdays: 7:30 to 5:30 PM. Friday: 7:30 to 11:00 AM). Explained that she will need to provide a photo ID, copy of insurance card, and list of medications. Explained that the screening process can take 2-2.5 hours, and she will be asked to complete paperwork about herself, her concerns, and any health challenges. Explained that she will then meet with a clinician to discuss her treatment needs, history, and plan. Aaron 64 with the patient via conference call today, and they explained that they will be closed on Monday, December 26, 2022. Provided the contact information below, via phone today.   Also sent this information via MyChart messaging, per her request.    [facility]   [address] [facility], [facility] [employer], W[address][phone]  [address] [place] [address] Road Phone, U#[phone]     She shared that her children have hired a personal care aid to assist she and her  with light housework and cooking, for 6 hours a day, starting next week. She is hopeful that this will help reduce her stress and anxiety levels. Plan:  Ongoing psychosocial support and resource referral, as desired by the patient and family. This  will follow up with the patient in two weeks, to check on her status, and progress with resources provided today.     EPC               ALAINA Covarrubias/GOVIND, [facility]   F#[phone] in the Last Year: Never true     Ran Out of Food in the Last Year: Never true   Transportation Needs: No Transportation Needs (9/17/2024)    TRANSPORTATION NEEDS     Transportation : No   Physical Activity: Sufficiently Active (9/17/2024)    Exercise Vital Sign     Days of Exercise per Week: 5 days     Minutes of Exercise per Session: 50 min   Stress: No Stress Concern Present (9/17/2024)    [place] [employer] - Occupational Stress Questionnaire     Feeling of Stress : Not at all   Housing Stability: Unknown (9/17/2024)    Housing Stability Vital Sign     Unable to Pay for Housing in the Last Year: No     Homeless in the Last Year: No

## 2025-06-14 NOTE — PATIENT INSTRUCTIONS
"  Assessment/Plan:   Viral pharyngitis  -     betamethasone acetate-betamethasone sodium phosphate injection 9 mg    Sore throat  -     POCT Strep A, Molecular    Viral respiratory infection     Sinusitis   If your condition worsens or fails to improve we recommend that you receive another evaluation at the ER immediately or contact your PCP to discuss your concerns or return here. You must understand that you've received an urgent care treatment only and that you may be released before all your medical problems are known or treated. You the patient will arrange for followup care as instructed.   If we discussed that I think your illness is viral it will not respond to antibiotics and it will last 10-14 days. Flonase (fluticasone) is a nasal spray which is available over the counter and may help with your symptoms, Asterpro (Azelastine) is a nasal antihistamine that can also be taken    Zyrtec D, Claritin D or allegra D can also help with symptoms of congestion and drainage.   If you have hypertension avoid using the "D" which is the decongestant   If you just have drainage you can take plain zyrtec, claritin or allegra   If you just have a congested feeling you can take pseudoephedrine (unless you have high blood pressure) which you have to sign for behind the counter. Do not buy the phenylephrine which is on the shelf as it is not effective   Rest and fluids are also important.   Tylenol or ibuprofen can also be used as directed for pain unless you have an allergy to them or medical condition such as stomach ulcers, kidney or liver disease or blood thinners etc for which you should not be taking these type of medications.   If you are flying in the next few days Afrin nose drops for the airplane flight upon take off and landing may help. Other than at those times refrain from using afrin.   If you were prescribed a narcotic do not drive or operate heavy machinery while taking these medications.    Orders Placed " This Encounter   Procedures    POCT Strep A, Molecular       Education and counseling done face to face regarding medical conditions and plan. Contact office if new symptoms develop. Should any symptoms ever significantly worsen seek emergency medical attention/go to ER. Follow up at least yearly for wellness or sooner PRN. Nurse to call patient with any results. The patient is receptive, expresses understanding and is agreeable to plan. All questions have been answered.

## 2025-06-16 ENCOUNTER — OFFICE VISIT (OUTPATIENT)
Dept: URGENT CARE | Facility: CLINIC | Age: 65
End: 2025-06-16
Payer: MEDICARE

## 2025-06-16 VITALS
BODY MASS INDEX: 26.29 KG/M2 | RESPIRATION RATE: 18 BRPM | OXYGEN SATURATION: 98 % | TEMPERATURE: 99 F | SYSTOLIC BLOOD PRESSURE: 127 MMHG | WEIGHT: 154 LBS | HEIGHT: 64 IN | DIASTOLIC BLOOD PRESSURE: 77 MMHG | HEART RATE: 83 BPM

## 2025-06-16 DIAGNOSIS — H66.93 BILATERAL ACUTE OTITIS MEDIA: Primary | ICD-10-CM

## 2025-06-16 PROCEDURE — 99213 OFFICE O/P EST LOW 20 MIN: CPT | Mod: ,,, | Performed by: FAMILY MEDICINE

## 2025-06-16 RX ORDER — PREDNISONE 20 MG/1
20 TABLET ORAL 2 TIMES DAILY
Qty: 6 TABLET | Refills: 0 | Status: SHIPPED | OUTPATIENT
Start: 2025-06-16 | End: 2025-06-19

## 2025-06-16 RX ORDER — AMOXICILLIN 875 MG/1
875 TABLET, COATED ORAL EVERY 12 HOURS
Qty: 14 TABLET | Refills: 0 | Status: SHIPPED | OUTPATIENT
Start: 2025-06-16 | End: 2025-06-23

## 2025-06-16 NOTE — PATIENT INSTRUCTIONS
Discussed the physical finding, condition and course.  Monitor the symptoms.  Antihistamine of choice like Claritin Zyrtec or Allegra for congestion  Warm saltwater gargles for sore throat.  Ibuprofen for pain and discomfort every 8 hours as needed.  Adequate hydration  Prednisone as anti inflammation for symptom relief as needed  Start antibiotics today.  Call or return to clinic for any questions.  Voiced understanding

## 2025-06-16 NOTE — PROGRESS NOTES
"Subjective:      Patient ID: Swapna Hayes is a 65 y.o. female.    Vitals:  height is 5' 4" (1.626 m) and weight is 69.9 kg (154 lb). Her oral temperature is 98.5 °F (36.9 °C). Her blood pressure is 127/77 and her pulse is 83. Her respiration is 18 and oxygen saturation is 98%.     Chief Complaint: Sinus Problem     Patient is a 65 y.o. female who presents to urgent care with complaints of sore throat, congestion, left ear discomfort and muffled hearing, slight cough, postnasal drip x 5 days. She was seen here on 6/14 and tested negative for strep and given a Celestone injection but has had no relief. Patient denies fever, nausea, vomiting, diarrhea.      Sinus Problem    ROS :  Constitutional : _ no fever, no body aches or headache  Eyes : _No redness, drainage or pain  HENT_ mild sore throat, postnasal drainage, and as per HPI  Respiratory_no wheezing, no shortness of breath  Cardiovascular_no chest pain  Gastrointestinal_ denies nausea vomiting abdominal pain or diarrhea  Musculoskeletal_no joint pain, no joint swelling  Integumentary_no skin rash or abnormal lesion    [place]:  65-year-old female present to clinic with concerns of worsening nasal congestion, sinus congestion, bilateral earache worse on left side.  Associated with muffled hearing.  No measured fever.  Reviewed the vital signs appears stable.  Seen in the clinic 3 days ago, tested negative for strep.    Objective:     Physical Exam  General : Alert and Oriented, No apparent distress, afebrile, appears comfortable sitting on exam table, clear speech  Neck - supple  HENT : Oropharynx no redness or swelling.  Bilateral ear canal excessive cerumen at the base, TM appears intact, dull and erythematous, not bulging  Respiratory : Bilateral equal breath sounds, nonlabored respirations  Cardiovascular : Rate, rhythm regular, normal volume pulse, no murmur  Gastrointestinal: Full abdomen, soft, nontender to palpate  Integumentary : Warm, Dry and no " rash    Assessment:     1. Bilateral acute otitis media      Plan:   Discussed the physical finding, condition and course.  Monitor the symptoms.  Antihistamine of choice like Claritin Zyrtec or Allegra for congestion  Warm saltwater gargles for sore throat.  Ibuprofen for pain and discomfort every 8 hours as needed.  Adequate hydration  Prednisone as anti inflammation for symptom relief as needed  Start antibiotics today.  Call or return to clinic for any questions.  Voiced understanding    Bilateral acute otitis media  -     amoxicillin (AMOXIL) 875 MG tablet; Take 1 tablet (875 mg total) by mouth every 12 (twelve) hours. for 7 days  Dispense: 14 tablet; Refill: 0  -     predniSONE (DELTASONE) 20 MG tablet; Take 1 tablet (20 mg total) by mouth 2 (two) times daily. for 3 days  Dispense: 6 tablet; Refill: 0

## 2025-06-20 ENCOUNTER — TELEPHONE (OUTPATIENT)
Dept: URGENT CARE | Facility: CLINIC | Age: 65
End: 2025-06-20
Payer: MEDICARE

## 2025-06-20 NOTE — TELEPHONE ENCOUNTER
Re-evaluation we will be in her best interest.  This can be done here at urgent care or can be done at the office of her PCP.

## 2025-06-20 NOTE — TELEPHONE ENCOUNTER
"Pt seen 6/16 and diagnosed with bilateral otitis media , treated with amox and prednisone. States still having ear discomfort and " weird sound " in left ear. Pt is wondering if she can be prescribed something else or does she need to come back for reevaluation ? Please advise.  "

## 2025-06-21 ENCOUNTER — OFFICE VISIT (OUTPATIENT)
Dept: URGENT CARE | Facility: CLINIC | Age: 65
End: 2025-06-21
Payer: MEDICARE

## 2025-06-21 VITALS
RESPIRATION RATE: 18 BRPM | DIASTOLIC BLOOD PRESSURE: 72 MMHG | HEIGHT: 64 IN | HEART RATE: 80 BPM | WEIGHT: 154 LBS | SYSTOLIC BLOOD PRESSURE: 108 MMHG | BODY MASS INDEX: 26.29 KG/M2 | TEMPERATURE: 98 F | OXYGEN SATURATION: 98 %

## 2025-06-21 DIAGNOSIS — H66.92 LEFT OTITIS MEDIA, UNSPECIFIED OTITIS MEDIA TYPE: ICD-10-CM

## 2025-06-21 DIAGNOSIS — H93.12 TINNITUS OF LEFT EAR: Primary | ICD-10-CM

## 2025-06-21 PROCEDURE — 99213 OFFICE O/P EST LOW 20 MIN: CPT | Mod: ,,, | Performed by: NURSE PRACTITIONER

## 2025-06-21 RX ORDER — ASPIRIN 325 MG
TABLET, DELAYED RELEASE (ENTERIC COATED) ORAL
COMMUNITY

## 2025-06-21 RX ORDER — CIPROFLOXACIN AND DEXAMETHASONE 3; 1 MG/ML; MG/ML
4 SUSPENSION/ DROPS AURICULAR (OTIC) 2 TIMES DAILY
Qty: 7.5 ML | Refills: 0 | Status: SHIPPED | OUTPATIENT
Start: 2025-06-21 | End: 2025-06-28

## 2025-06-21 RX ORDER — OFLOXACIN 3 MG/ML
5 SOLUTION AURICULAR (OTIC) 2 TIMES DAILY
Qty: 10 ML | Refills: 0 | Status: SHIPPED | OUTPATIENT
Start: 2025-06-21 | End: 2025-06-28

## 2025-06-21 NOTE — PROGRESS NOTES
"Subjective:      Patient ID: Swapna Hayes is a 65 y.o. female.    Vitals:  height is 5' 4" (1.626 m) and weight is 69.9 kg (154 lb). Her oral temperature is 98 °F (36.7 °C). Her blood pressure is 108/72 and her pulse is 80. Her respiration is 18 and oxygen saturation is 98%.     Chief Complaint: Ear Problem     Patient is a 65 y.o. female who presents to urgent care with complaints of ringing and stuffiness in left ear, HA, hearing loss in Lt ear  x 1 week. Alleviating factors include amoxicillin, prednisone with mild amount of relief. Patient denies exposure to loud noise or water. Pt. Came in on Saturday and  Monday with the same symptoms. She was prescribed abs, steroid, and received a steroid inj.  She states her ear has gotten worse since then.      Constitution: Negative for fatigue and fever.   HENT:  Positive for tinnitus and hearing loss. Negative for sinus pain and sore throat.    Cardiovascular: Negative.    Eyes: Negative.    Gastrointestinal:  Negative for nausea, vomiting and diarrhea.   Endocrine: negative.   Genitourinary:  Negative for dysuria, frequency, urgency and urine decreased.   Musculoskeletal:  Negative for muscle ache.   Neurological: Negative.  Negative for headaches.      Objective:     Physical Exam   Constitutional: She is oriented to person, place, and time. She appears well-developed. She is cooperative.  Non-toxic appearance. She does not appear ill. No distress.   HENT:   Head: Normocephalic and atraumatic.   Ears:   Right Ear: Hearing, tympanic membrane, external ear and ear canal normal.   Left Ear: Tympanic membrane normal. impacted cerumen  Nose: Nose normal. No mucosal edema, rhinorrhea or nasal deformity. No epistaxis. Right sinus exhibits no maxillary sinus tenderness and no frontal sinus tenderness. Left sinus exhibits no maxillary sinus tenderness and no frontal sinus tenderness.   Mouth/Throat: Uvula is midline, oropharynx is clear and moist and mucous membranes are " normal. No trismus in the jaw. Normal dentition. No uvula swelling. No oropharyngeal exudate, posterior oropharyngeal edema or posterior oropharyngeal erythema.   Left ear canal totally obstructed by a large amount of cerumen  After cerumen was removed via lavage the TM is intact but shows a mild amount of erythema without bulging or any fluid that can be visualized  Patient is still having ongoing mild tinnitus but hearing has improved after clearing out canal of the cerumen      Comments: Left ear canal totally obstructed by a large amount of cerumen  After cerumen was removed via lavage the TM is intact but shows a mild amount of erythema without bulging or any fluid that can be visualized  Patient is still having ongoing mild tinnitus but hearing has improved after clearing out canal of the cerumen  Eyes: Conjunctivae and lids are normal. No scleral icterus.   Neck: Trachea normal and phonation normal. Neck supple. No edema present. No erythema present. No neck rigidity present.   Cardiovascular: Normal rate, regular rhythm, normal heart sounds and normal pulses.   Pulmonary/Chest: Effort normal and breath sounds normal. No respiratory distress. She has no decreased breath sounds. She has no rhonchi.   Abdominal: Normal appearance.   Musculoskeletal: Normal range of motion.         General: No deformity. Normal range of motion.   Neurological: She is alert and oriented to person, place, and time. She exhibits normal muscle tone. Coordination normal.   Skin: Skin is warm, dry, intact, not diaphoretic and not pale.   Psychiatric: Her speech is normal and behavior is normal. Judgment and thought content normal.   Nursing note and vitals reviewed.      Assessment:     1. Tinnitus of left ear    2. Left otitis media, unspecified otitis media type        Plan:   Discussed treatment with eardrops if symptoms do not improve in the next 24 hours   With the findings of cerumen impaction possibly causing patient's current  ear discomfort differential of Sudden Sensorineural Hearing Loss was discussed and of the need for ENT referral urgently we will be warranted, patient will monitor very closely for any worsening symptoms be re-evaluated this does occur    Tinnitus of left ear    Left otitis media, unspecified otitis media type    Other orders  -     ciprofloxacin-dexAMETHasone 0.3-0.1% (CIPRODEX) 0.3-0.1 % DrpS; Place 4 drops into the left ear 2 (two) times daily. for 7 days  Dispense: 7.5 mL; Refill: 0

## 2025-07-17 ENCOUNTER — TELEPHONE (OUTPATIENT)
Dept: FAMILY MEDICINE | Facility: CLINIC | Age: 65
End: 2025-07-17
Payer: MEDICARE

## 2025-07-27 NOTE — PROGRESS NOTES
Patient ID: [id number]     Chief Complaint: Left ear fullness/ ringing    HPI:     Swapna Hayes is a 65 y.o. female here today for acute visit:   Has had 3 urgent care visits in the last month-problems with left ear fullness and ringing-initial visit was due to decreased hearing-and discomfort-diagnosed with viral pharyngitis-discharged with instructions take over-the-counter antihistamine/Tylenol and ibuprofen for pain-patient did receive a steroid injection at the urgent care.  Went back to the urgent care the next day with continuing discomfort-diagnosed with bilateral ear infection-started on antibiotics and Medrol Dosepak.  Continued to experience fullness of her left ear and associated ringing.    Past Medical History:   Diagnosis Date    Mixed hyperlipidemia 7/15/2023    Primary osteoarthritis involving multiple joints 7/15/2023        Past Surgical History:   Procedure Laterality Date     SECTION      Closed treatment of trimalleolar ankle fracture; with manipulation (2018)      COLONOSCOPY  2014    KNEE SURGERY Bilateral 1993    Repair Right Hand Subcutaneous Tissue and Fascia, Open Approach (2018)      Reposition Right Fibula, External Approach (2018)      Reposition Right Tibia, External Approach (2018)      TOTAL KNEE ARTHROPLASTY Left 2022        Social History[1]     Social History[2]     Current Outpatient Medications   Medication Instructions    calcium carbonate/vitamin D3 (VITAMIN D-3 ORAL) Vitamin D3    MULTIVITAMIN ORAL Multivitamin    omega 3-dha-epa-fish oil (FISH OIL) 60- mg Cap Fish Oil       Review of patient's allergies indicates:  No Known Allergies     Patient Care Team:  Ivette Cordero MD as PCP - General (Family Medicine)  Randolph Dykes MD as Consulting Physician (Orthopedic Surgery)       Subjective:     Review of Systems    12 point review of systems conducted, negative except as stated in the history of present illness. See  "HPI for details.      Objective:     Visit Vitals  /69 (BP Location: Left arm, Patient Position: Sitting)   Pulse 97   Resp 16   Ht 5' 4" (1.626 m)   Wt 71.5 kg (157 lb 9.6 oz)   SpO2 96%   BMI 27.05 kg/m²       Physical Exam    General: Alert and oriented, No acute distress.  Head: Normocephalic, Atraumatic.  Eye: Pupils are equal, round and reactive to light, Extraocular movements are intact, Sclera non-icteric.  Ears/Nose/Throat: Normal-middle ear fluid, Mucosa moist,Clear.  Neck/Thyroid: Supple, Non-tender  Respiratory: Clear to auscultation bilaterally  Cardiovascular: S1/S2 normal  Gastrointestinal: Soft, Non-tender, Non-distended, Normal bowel sounds, No palpable organomegaly.  Musculoskeletal: Normal range of motion.  Integumentary: Warm, Dry  Extremities: No clubbing, cyanosis or edema  Neurologic: No focal deficits, Cranial Nerves II-XII are grossly intact  Psychiatric: Normal interaction, Coherent speech       Assessment:       ICD-10-CM ICD-9-CM   1. Fluid collection of middle ear  H65.90 381.4   2. Tinnitus of left ear  H93.12 388.30        Plan:     1. Fluid collection of middle ear (Primary)  Pathophysiology/Treatment/ [facility]angers Discussed.  Recommendations for patient to use nasal steroid regularly for 1 week-call office with update.     2. Tinnitus of left ear  Pathophysiology/Treatment/ [facility]angers Discussed.  May need ENT evaluation.  If continued ringing patient to follow-up with ENT.  - Ambulatory referral/consult to ENT; Future      No follow-ups on file. In addition to their scheduled follow up, the patient has also been instructed to follow up on as needed basis.     Future Appointments   Date Time Provider Department Center   8/8/2025  2:00 PM Cristela Soto, HO [facility]   10/7/2025 10:45 AM Ivette Cordero MD [facility]        Ivette Cordero MD         [1]   Social History  Tobacco Use    Smoking status: Never    Smokeless tobacco: Never   Substance Use Topics "    Alcohol use: Yes     Comment: occasional    Drug use: Never   [2]   Social History  Socioeconomic History    Marital status:      Spouse name: Jose    Number of children: 2

## 2025-07-30 ENCOUNTER — OFFICE VISIT (OUTPATIENT)
Dept: FAMILY MEDICINE | Facility: CLINIC | Age: 65
End: 2025-07-30
Payer: MEDICARE

## 2025-07-30 VITALS
HEIGHT: 64 IN | SYSTOLIC BLOOD PRESSURE: 102 MMHG | WEIGHT: 157.63 LBS | RESPIRATION RATE: 16 BRPM | OXYGEN SATURATION: 96 % | HEART RATE: 97 BPM | BODY MASS INDEX: 26.91 KG/M2 | DIASTOLIC BLOOD PRESSURE: 69 MMHG

## 2025-07-30 DIAGNOSIS — H93.12 TINNITUS OF LEFT EAR: ICD-10-CM

## 2025-07-30 DIAGNOSIS — H65.90 FLUID COLLECTION OF MIDDLE EAR: Primary | ICD-10-CM

## 2025-07-30 PROCEDURE — 99213 OFFICE O/P EST LOW 20 MIN: CPT | Mod: ,,, | Performed by: FAMILY MEDICINE

## 2025-08-23 ENCOUNTER — PATIENT MESSAGE (OUTPATIENT)
Dept: RESEARCH | Facility: HOSPITAL | Age: 65
End: 2025-08-23
Payer: MEDICARE